# Patient Record
Sex: FEMALE | Race: BLACK OR AFRICAN AMERICAN | NOT HISPANIC OR LATINO
[De-identification: names, ages, dates, MRNs, and addresses within clinical notes are randomized per-mention and may not be internally consistent; named-entity substitution may affect disease eponyms.]

---

## 2022-11-29 ENCOUNTER — NON-APPOINTMENT (OUTPATIENT)
Age: 22
End: 2022-11-29

## 2023-03-11 ENCOUNTER — EMERGENCY (EMERGENCY)
Facility: HOSPITAL | Age: 23
LOS: 1 days | Discharge: HOME | End: 2023-03-11
Attending: EMERGENCY MEDICINE
Payer: COMMERCIAL

## 2023-03-11 VITALS
TEMPERATURE: 99 F | RESPIRATION RATE: 20 BRPM | DIASTOLIC BLOOD PRESSURE: 89 MMHG | OXYGEN SATURATION: 100 % | SYSTOLIC BLOOD PRESSURE: 147 MMHG | HEART RATE: 118 BPM | WEIGHT: 160.06 LBS

## 2023-03-11 LAB
ALBUMIN SERPL ELPH-MCNC: 5 G/DL — SIGNIFICANT CHANGE UP (ref 3.3–5.2)
ALP SERPL-CCNC: 64 U/L — SIGNIFICANT CHANGE UP (ref 40–120)
ALT FLD-CCNC: 21 U/L — SIGNIFICANT CHANGE UP
AMPHET UR-MCNC: NEGATIVE — SIGNIFICANT CHANGE UP
ANION GAP SERPL CALC-SCNC: 22 MMOL/L — HIGH (ref 5–17)
APAP SERPL-MCNC: <3 UG/ML — LOW (ref 10–26)
APPEARANCE UR: CLEAR — SIGNIFICANT CHANGE UP
AST SERPL-CCNC: 15 U/L — SIGNIFICANT CHANGE UP
BACTERIA # UR AUTO: ABNORMAL
BARBITURATES UR SCN-MCNC: NEGATIVE — SIGNIFICANT CHANGE UP
BASOPHILS # BLD AUTO: 0.03 K/UL — SIGNIFICANT CHANGE UP (ref 0–0.2)
BASOPHILS NFR BLD AUTO: 0.5 % — SIGNIFICANT CHANGE UP (ref 0–2)
BENZODIAZ UR-MCNC: NEGATIVE — SIGNIFICANT CHANGE UP
BILIRUB SERPL-MCNC: 0.6 MG/DL — SIGNIFICANT CHANGE UP (ref 0.4–2)
BILIRUB UR-MCNC: NEGATIVE — SIGNIFICANT CHANGE UP
BUN SERPL-MCNC: 10.2 MG/DL — SIGNIFICANT CHANGE UP (ref 8–20)
CALCIUM SERPL-MCNC: 9.9 MG/DL — SIGNIFICANT CHANGE UP (ref 8.4–10.5)
CHLORIDE SERPL-SCNC: 98 MMOL/L — SIGNIFICANT CHANGE UP (ref 96–108)
CO2 SERPL-SCNC: 17 MMOL/L — LOW (ref 22–29)
COCAINE METAB.OTHER UR-MCNC: NEGATIVE — SIGNIFICANT CHANGE UP
COLOR SPEC: YELLOW — SIGNIFICANT CHANGE UP
CREAT SERPL-MCNC: 0.74 MG/DL — SIGNIFICANT CHANGE UP (ref 0.5–1.3)
DIFF PNL FLD: ABNORMAL
EGFR: 118 ML/MIN/1.73M2 — SIGNIFICANT CHANGE UP
EOSINOPHIL # BLD AUTO: 0.01 K/UL — SIGNIFICANT CHANGE UP (ref 0–0.5)
EOSINOPHIL NFR BLD AUTO: 0.2 % — SIGNIFICANT CHANGE UP (ref 0–6)
EPI CELLS # UR: ABNORMAL
ETHANOL SERPL-MCNC: <10 MG/DL — SIGNIFICANT CHANGE UP (ref 0–9)
GLUCOSE SERPL-MCNC: 103 MG/DL — HIGH (ref 70–99)
GLUCOSE UR QL: NEGATIVE MG/DL — SIGNIFICANT CHANGE UP
HCG SERPL-ACNC: <4 MIU/ML — SIGNIFICANT CHANGE UP
HCT VFR BLD CALC: 44.5 % — SIGNIFICANT CHANGE UP (ref 34.5–45)
HGB BLD-MCNC: 14.5 G/DL — SIGNIFICANT CHANGE UP (ref 11.5–15.5)
IMM GRANULOCYTES NFR BLD AUTO: 0.3 % — SIGNIFICANT CHANGE UP (ref 0–0.9)
KETONES UR-MCNC: ABNORMAL
LEUKOCYTE ESTERASE UR-ACNC: NEGATIVE — SIGNIFICANT CHANGE UP
LIDOCAIN IGE QN: 30 U/L — SIGNIFICANT CHANGE UP (ref 22–51)
LYMPHOCYTES # BLD AUTO: 1.63 K/UL — SIGNIFICANT CHANGE UP (ref 1–3.3)
LYMPHOCYTES # BLD AUTO: 25 % — SIGNIFICANT CHANGE UP (ref 13–44)
MCHC RBC-ENTMCNC: 27.7 PG — SIGNIFICANT CHANGE UP (ref 27–34)
MCHC RBC-ENTMCNC: 32.6 GM/DL — SIGNIFICANT CHANGE UP (ref 32–36)
MCV RBC AUTO: 84.9 FL — SIGNIFICANT CHANGE UP (ref 80–100)
METHADONE UR-MCNC: NEGATIVE — SIGNIFICANT CHANGE UP
MONOCYTES # BLD AUTO: 0.51 K/UL — SIGNIFICANT CHANGE UP (ref 0–0.9)
MONOCYTES NFR BLD AUTO: 7.8 % — SIGNIFICANT CHANGE UP (ref 2–14)
NEUTROPHILS # BLD AUTO: 4.33 K/UL — SIGNIFICANT CHANGE UP (ref 1.8–7.4)
NEUTROPHILS NFR BLD AUTO: 66.2 % — SIGNIFICANT CHANGE UP (ref 43–77)
NITRITE UR-MCNC: NEGATIVE — SIGNIFICANT CHANGE UP
OPIATES UR-MCNC: NEGATIVE — SIGNIFICANT CHANGE UP
PCP SPEC-MCNC: SIGNIFICANT CHANGE UP
PCP UR-MCNC: NEGATIVE — SIGNIFICANT CHANGE UP
PH UR: 6 — SIGNIFICANT CHANGE UP (ref 5–8)
PLATELET # BLD AUTO: 285 K/UL — SIGNIFICANT CHANGE UP (ref 150–400)
POTASSIUM SERPL-MCNC: 3.6 MMOL/L — SIGNIFICANT CHANGE UP (ref 3.5–5.3)
POTASSIUM SERPL-SCNC: 3.6 MMOL/L — SIGNIFICANT CHANGE UP (ref 3.5–5.3)
PROT SERPL-MCNC: 8.3 G/DL — SIGNIFICANT CHANGE UP (ref 6.6–8.7)
PROT UR-MCNC: 100
RBC # BLD: 5.24 M/UL — HIGH (ref 3.8–5.2)
RBC # FLD: 12.8 % — SIGNIFICANT CHANGE UP (ref 10.3–14.5)
RBC CASTS # UR COMP ASSIST: SIGNIFICANT CHANGE UP /HPF (ref 0–4)
SALICYLATES SERPL-MCNC: <0.6 MG/DL — LOW (ref 10–20)
SARS-COV-2 RNA SPEC QL NAA+PROBE: SIGNIFICANT CHANGE UP
SODIUM SERPL-SCNC: 137 MMOL/L — SIGNIFICANT CHANGE UP (ref 135–145)
SP GR SPEC: 1.02 — SIGNIFICANT CHANGE UP (ref 1.01–1.02)
THC UR QL: POSITIVE
UROBILINOGEN FLD QL: 1 MG/DL
WBC # BLD: 6.53 K/UL — SIGNIFICANT CHANGE UP (ref 3.8–10.5)
WBC # FLD AUTO: 6.53 K/UL — SIGNIFICANT CHANGE UP (ref 3.8–10.5)
WBC UR QL: SIGNIFICANT CHANGE UP /HPF (ref 0–5)

## 2023-03-11 PROCEDURE — 93010 ELECTROCARDIOGRAM REPORT: CPT

## 2023-03-11 PROCEDURE — 99285 EMERGENCY DEPT VISIT HI MDM: CPT

## 2023-03-11 RX ORDER — FAMOTIDINE 10 MG/ML
20 INJECTION INTRAVENOUS ONCE
Refills: 0 | Status: COMPLETED | OUTPATIENT
Start: 2023-03-11 | End: 2023-03-11

## 2023-03-11 RX ORDER — ACETAMINOPHEN 500 MG
975 TABLET ORAL ONCE
Refills: 0 | Status: COMPLETED | OUTPATIENT
Start: 2023-03-11 | End: 2023-03-11

## 2023-03-11 RX ADMIN — FAMOTIDINE 20 MILLIGRAM(S): 10 INJECTION INTRAVENOUS at 18:37

## 2023-03-11 RX ADMIN — Medication 975 MILLIGRAM(S): at 18:37

## 2023-03-11 NOTE — ED ADULT NURSE REASSESSMENT NOTE - NS ED NURSE REASSESS COMMENT FT1
assumed pt care at 1930 - alert, awake and oriented, speaking in full sentences. pt awaiting for lab results. provided with warm blanket. will continue to monitor.

## 2023-03-11 NOTE — ED PROVIDER NOTE - CLINICAL SUMMARY MEDICAL DECISION MAKING FREE TEXT BOX
21yo F denies pmh presents c/o abdominal pain, bizarre behavior. Pepcid, tylenol, zofran for abdominal symptoms. Labs pending. Psych consulted. 23yo F denies pmh presents c/o abdominal pain, bizarre behavior. Pepcid, tylenol, zofran for abdominal symptoms. Labs pending. Psych consulted.    CHEYENNE Dockery: pt denies any physical complaints. Labs WNL, no leukocytosis, H+H stable, electrolytes stable. UA showed no evidence of infection. tox screen pos for THC. EKG NSR. reviewed results with pt. psych consulted. admitted to [facility] for transfer to [facility] unit on 913. signout out to Dr. Joaquin.
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
## 2023-03-11 NOTE — ED ADULT NURSE NOTE - OBJECTIVE STATEMENT
Assumed care of pt in [facility]. Pt A&O x 4 BIBA for abdominal pain x 6 days. Denies new foods. Denies sick contacts. +BS all 4 quadrants. Abdomen soft. No N/V/D. IV access obtained; specimens sent to lab.

## 2023-03-11 NOTE — ED PROVIDER NOTE - PHYSICAL EXAMINATION
General: well appearing, NAD  Head: NC/AT  Cardiac: RRR, no m/r/g  Respiratory: CTABL, equal chest wall expansions, no conversational dyspnea  Abdomen: soft, ND, +mild epigastric ttp  Neuro: AAOx3, coordinated movement of all 4 extremities  Psych: calm, cooperative, flattened affect  Skin: warm and dry General: well appearing, NAD  Head: NC/AT  Cardiac: RRR, no m/r/g  Respiratory: CTABL, equal chest wall expansions, no conversational dyspnea  Abdomen: soft, ND, +mild epigastric ttp  Neuro: AAOx3, coordinated movement of all 4 extremities  Psych: calm, cooperative, flattened affect  Skin: warm and dry.

## 2023-03-11 NOTE — ED PROVIDER NOTE - NS ED ROS FT
Constitutional: no fever, no sweats, and no chills.  CV: no chest pain, no edema.  Resp: no cough, no dyspnea  GI: +abdominal pain, +nausea and no vomiting.  MSK: no msk pain, no weakness  Skin: no lesions, and no rashes.  Neuro: no LOC, no headache, no dizziness  Psych: no SI, no HI  ROS otherwise negative except as noted in HPI.

## 2023-03-11 NOTE — ED ADULT TRIAGE NOTE - GLASGOW COMA SCALE: BEST MOTOR RESPONSE, MLM
Patient Education     Sinusitis (No Antibiotics)    The sinuses are air-filled spaces within the bones of the face. They connect to the inside of the nose. Sinusitis is an inflammation of the tissue lining the sinus cavity. Sinus inflammation can occur during a cold. It can also be due to allergies to pollens and other particles in the air. It can cause symptoms such as sinus congestion, headache, sore throat, facial swelling and fullness. It may also cause a low-grade fever. No infection is present, and no antibiotic treatment is needed.  Home care  · Drink plenty of water, hot tea, and other liquids. This may help thin mucus. It also may promote sinus drainage.  · Heat may help soothe painful areas of the face. Use a towel soaked in hot water. Or,  the shower and direct the hot spray onto your face. Using a vaporizer along with a menthol rub at night may also help.   · An expectorant containing guaifenesin may help thin the mucus and promote drainage from the sinuses.  · Over-the-counter decongestants may be used unless a similar medicine was prescribed. Nasal sprays work the fastest. Use one that contains phenylephrine or oxymetazoline. First blow the nose gently. Then use the spray. Do not use these medicines more often than directed on the label or symptoms may get worse. You may also use tablets containing pseudoephedrine. Avoid products that combine ingredients, because side effects may be increased. Read labels. You can also ask the pharmacist for help. (NOTE: Persons with high blood pressure should not use decongestants. They can raise blood pressure.)  · Over-the-counter antihistamines may help if allergies contributed to your sinusitis.    · Use acetaminophen or ibuprofen to control pain, unless another pain medicine was prescribed. (If you have chronic liver or kidney disease or ever had a stomach ulcer, talk with your doctor before using these medicines. Aspirin should never be used in anyone  under 18 years of age who is ill with a fever. It may cause severe liver damage.)  · Use nasal rinses or irrigation as instructed by your health care provider.  · Don't smoke. This can worsen symptoms.  Follow-up care  Follow up with your healthcare provider or our staff if you are not improving within the next week.  When to seek medical advice  Call your healthcare provider if any of these occur:  · Green or yellow discharge from the nose or into the throat  · Facial pain or headache becoming more severe  · Stiff neck  · Unusual drowsiness or confusion  · Swelling of the forehead or eyelids  · Vision problems, including blurred or double vision  · Fever of 100.4ºF (38ºC) or higher, or as directed by your healthcare provider  · Seizure  · Breathing problems  · Symptoms not resolving within 10 days  © 1151-0279 [employer]. [address] [place] [address]. All rights reserved. This information is not intended as a substitute for professional medical care. Always follow your healthcare professional's instructions.           Patient Education     External Ear Infection (Adult)    External otitis (also called “swimmer’s ear”) is an infection in the ear canal. It is often caused by bacteria or fungus. It can occur a few days after water gets trapped in the ear canal (from swimming or bathing). It can also occur after cleaning too deeply in the ear canal with a cotton swab or other object. Sometimes, hair care products get into the ear canal and cause this problem.  Symptoms can include pain, fever, itching, redness, drainage, or swelling of the ear canal. Temporary hearing loss may also occur.  Home care  · Do not try to clean the ear canal. This can push pus and bacteria deeper into the canal.  · Use prescribed ear drops as directed. These help reduce swelling and fight the infection. If an ear wick was placed in the ear canal, apply drops right onto the end of the wick. The wick will draw the  medication into the ear canal even if it is swollen closed.  · A cotton ball may be loosely placed in the outer ear to absorb any drainage.  · You may use acetaminophen or ibuprofen to control pain, unless another medication was prescribed. Note: If you have chronic liver or kidney disease or ever had a stomach ulcer or GI bleeding, talk to your health care provider before taking any of these medications.  · Do not allow water to get into your ear when bathing. Also, avoid swimming until the infection has cleared.  Prevention  · Keep your ears dry. This helps lower the risk of infection. Dry your ears with a towel or hair dryer after getting wet. Also, use ear plugs when swimming.  · Do not stick any objects in the ear to remove wax.  · If you feel water trapped in your ear, use ear drops right away. You can get these drops over the counter at most drugstores.  They work by removing water from the ear canal.  Follow-up care  Follow up with your health care provider in one week, or as advised.   When to seek medical advice  Call your health care provider right away if any of these occur:  · Ear pain becomes worse or doesn’t improve after 3 days of treatment  · Redness or swelling of the outer ear occurs or gets worse  · Headache  · Painful or stiff neck  · Drowsiness or confusion  · Fever of 100.4ºF (38ºC) or higher, or as directed by your health care provider  · Seizure  © 0776-1518 [employer]. [address] [place] [address], [place] [address]. All rights reserved. This information is not intended as a substitute for professional medical care. Always follow your healthcare professional's instructions.            (M6) obeys commands

## 2023-03-12 ENCOUNTER — INPATIENT (INPATIENT)
Facility: HOSPITAL | Age: 23
LOS: 25 days | Discharge: HOME | End: 2023-04-07
Attending: STUDENT IN AN ORGANIZED HEALTH CARE EDUCATION/TRAINING PROGRAM | Admitting: PSYCHIATRY & NEUROLOGY
Payer: COMMERCIAL

## 2023-03-12 VITALS
SYSTOLIC BLOOD PRESSURE: 122 MMHG | TEMPERATURE: 98 F | RESPIRATION RATE: 18 BRPM | DIASTOLIC BLOOD PRESSURE: 82 MMHG | OXYGEN SATURATION: 97 % | HEART RATE: 103 BPM

## 2023-03-12 VITALS — RESPIRATION RATE: 16 BRPM | TEMPERATURE: 99 F | WEIGHT: 151.9 LBS

## 2023-03-12 DIAGNOSIS — F29 UNSPECIFIED PSYCHOSIS NOT DUE TO A SUBSTANCE OR KNOWN PHYSIOLOGICAL CONDITION: ICD-10-CM

## 2023-03-12 DIAGNOSIS — R46.2 STRANGE AND INEXPLICABLE BEHAVIOR: ICD-10-CM

## 2023-03-12 PROCEDURE — 93005 ELECTROCARDIOGRAM TRACING: CPT

## 2023-03-12 PROCEDURE — 84702 CHORIONIC GONADOTROPIN TEST: CPT

## 2023-03-12 PROCEDURE — 99284 EMERGENCY DEPT VISIT MOD MDM: CPT | Mod: 1L,95

## 2023-03-12 PROCEDURE — 36415 COLL VENOUS BLD VENIPUNCTURE: CPT

## 2023-03-12 PROCEDURE — 90792 PSYCH DIAG EVAL W/MED SRVCS: CPT | Mod: 1L,95

## 2023-03-12 PROCEDURE — 99284 EMERGENCY DEPT VISIT MOD MDM: CPT | Mod: 25

## 2023-03-12 PROCEDURE — 85025 COMPLETE CBC W/AUTO DIFF WBC: CPT

## 2023-03-12 PROCEDURE — 99233 SBSQ HOSP IP/OBS HIGH 50: CPT

## 2023-03-12 PROCEDURE — 80307 DRUG TEST PRSMV CHEM ANLYZR: CPT

## 2023-03-12 PROCEDURE — 96374 THER/PROPH/DIAG INJ IV PUSH: CPT

## 2023-03-12 PROCEDURE — U0003: CPT

## 2023-03-12 PROCEDURE — 83690 ASSAY OF LIPASE: CPT

## 2023-03-12 PROCEDURE — 80053 COMPREHEN METABOLIC PANEL: CPT

## 2023-03-12 PROCEDURE — G0378: CPT

## 2023-03-12 PROCEDURE — U0005: CPT

## 2023-03-12 PROCEDURE — 81001 URINALYSIS AUTO W/SCOPE: CPT

## 2023-03-12 RX ORDER — ACETAMINOPHEN 500 MG
650 TABLET ORAL EVERY 6 HOURS
Refills: 0 | Status: DISCONTINUED | OUTPATIENT
Start: 2023-03-12 | End: 2023-04-07

## 2023-03-12 RX ORDER — DIPHENHYDRAMINE HCL 50 MG
50 CAPSULE ORAL EVERY 6 HOURS
Refills: 0 | Status: DISCONTINUED | OUTPATIENT
Start: 2023-03-12 | End: 2023-04-07

## 2023-03-12 RX ORDER — HALOPERIDOL DECANOATE 100 MG/ML
2 INJECTION INTRAMUSCULAR ONCE
Refills: 0 | Status: DISCONTINUED | OUTPATIENT
Start: 2023-03-12 | End: 2023-04-07

## 2023-03-12 RX ORDER — HALOPERIDOL DECANOATE 100 MG/ML
2 INJECTION INTRAMUSCULAR EVERY 6 HOURS
Refills: 0 | Status: DISCONTINUED | OUTPATIENT
Start: 2023-03-12 | End: 2023-04-07

## 2023-03-12 RX ORDER — LANOLIN ALCOHOL/MO/W.PET/CERES
3 CREAM (GRAM) TOPICAL AT BEDTIME
Refills: 0 | Status: DISCONTINUED | OUTPATIENT
Start: 2023-03-12 | End: 2023-04-07

## 2023-03-12 RX ORDER — DIPHENHYDRAMINE HCL 50 MG
50 CAPSULE ORAL ONCE
Refills: 0 | Status: DISCONTINUED | OUTPATIENT
Start: 2023-03-12 | End: 2023-04-07

## 2023-03-12 RX ADMIN — Medication 3 MILLIGRAM(S): at 21:03

## 2023-03-12 NOTE — BH PATIENT PROFILE - FALL HARM RISK - UNIVERSAL INTERVENTIONS
Bed in lowest position, wheels locked, appropriate side rails in place/Call bell, personal items and telephone in reach/Instruct patient to call for assistance before getting out of bed or chair/Non-slip footwear when patient is out of bed/[place] to call system/Physically safe environment - no spills, clutter or unnecessary equipment/Purposeful Proactive Rounding/Room/bathroom lighting operational, light cord in reach

## 2023-03-12 NOTE — ED BEHAVIORAL HEALTH ASSESSMENT NOTE - OTHER
family danger to self/others and unable to care for self - patient presents disorganized, impulsive and paranoid

## 2023-03-12 NOTE — ED BEHAVIORAL HEALTH ASSESSMENT NOTE - DESCRIPTION
lives at home with parents and sister 12yo, parents are nurses, patient was in [employer] getting bachelors in science/psychology denies calm and cooperative
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
## 2023-03-12 NOTE — ED ADULT NURSE REASSESSMENT NOTE - NS ED NURSE REASSESS COMMENT FT1
pt resting/sleeping in NAD at this time. pt awakened to offer the telephone as requested by her father. pt responded that she will call him later. pt offers no complaints at this time. pt has made no attempts to harm herself or others at this time. pt safety being maintained.
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
## 2023-03-12 NOTE — ED BEHAVIORAL HEALTH NOTE - BEHAVIORAL HEALTH NOTE
===================     PRE-HOSPITAL COURSE     ===================     SOURCE: RN and secondhand ED documentation      DETAILS: BIB EMS     ============     ED COURSE     ============     SOURCE: RN and secondhand ED documentation      ARRIVAL: [facility]     BELONGINGS: No belongings of note. All belongings were provided to hospital security, and patient currently in a gown with a 1:1 staff member.     BEHAVIOR:   Writer attempted to contact RN multiple times and was unsuccessful. Per chart note, pt has been calm, cooperative and in behavioral control in ER. Per chart, pt presented for abdominal pain and bizarre behavior. Per chart, pt’s father noted pt has been staying at her grandparents' house and refusing to leave the room.      TREATMENT:  none required      VISITORS: no one at bedside

## 2023-03-12 NOTE — ED CDU PROVIDER INITIAL DAY NOTE - CLINICAL SUMMARY MEDICAL DECISION MAKING FREE TEXT BOX
23yo F denies pmh BIBA presents c/o abdominal pain. Pt BIBA as per parents request for bizzare behavior and depression. pt denied bizzare behavior, depression, SI to ED providers and c/o abdominal pain. pain was treated in ED. pt reports improvement of pain. Psych consulted. Labs WNL, no leukocytosis, H+H stable, electrolytes stable. UA showed no evidence of infection. tox screen pos for THC. EKG NSR. reviewed results with pt. psych consulted. placed in virtual obs for transfer to [facility] unit on 913, per psych eval. awaiting transfer.

## 2023-03-12 NOTE — ED ADULT NURSE REASSESSMENT NOTE - NS ED NURSE REASSESS COMMENT FT1
Patient awaiting transfer to [facility]. Aware of plan to transfer, expressed understanding of transfer process. Nurse to nurse report given to MARY KATE Powers at receiving facility. Pt mood depressed, affect appropriate to mood. No complaints offered. Awaiting EMS transport. Patient awaiting transfer to [facility]. Aware of plan to transfer, expressed understanding of transfer process. Nurse to nurse report given to MARY KATE Dyer at receiving facility. Pt mood depressed, affect appropriate to mood. No complaints offered. Awaiting EMS transport.

## 2023-03-12 NOTE — ED BEHAVIORAL HEALTH ASSESSMENT NOTE - RISK ASSESSMENT
3 patient is at low risk for self harm. patient is at moderate risk for harm. risk factors include presenting symptoms (disorganized, tangential), poor sleep, poor ADLs, depressive symptoms, poor functioning, not engaged in active treatment, impulsive and unpredictable, poor insight. Protective factors include family support, patient denies suicidal ideation,

## 2023-03-12 NOTE — ED CDU PROVIDER INITIAL DAY NOTE - NS ED ATTENDING STATEMENT MOD
This was a shared visit with the LEODAN. I reviewed and verified the documentation and independently performed the documented:

## 2023-03-12 NOTE — ED ADULT NURSE REASSESSMENT NOTE - NS ED NURSE REASSESS COMMENT FT1
Assumed care of pt from previous RN. A&Ox4, RR even and unlabored. skin is warm and dry. PT denies any complaints at this time. Awaiting reeval by psych.
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
## 2023-03-12 NOTE — ED ADULT NURSE REASSESSMENT NOTE - COMFORT CARE
ambulated to bathroom/darkened lights/meal provided/plan of care explained/po fluids offered/warm blanket provided
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
## 2023-03-12 NOTE — ED ADULT NURSE REASSESSMENT NOTE - NS ED NURSE REASSESS COMMENT FT1
PT to be involuntary psych admission to [facility]. PT denies SI/HI or hallucinations. PT being changed into yellow gown and all belongings moved and secured. PT has understanding for psych admission. Report given to Humberto HARTMANN in .

## 2023-03-12 NOTE — ED CDU PROVIDER INITIAL DAY NOTE - PHYSICAL EXAMINATION
General: well appearing, NAD  Head: NC/AT  Cardiac: RRR, no m/r/g  Respiratory: CTABL, equal chest wall expansions, no conversational dyspnea  Abdomen: soft, ND, +mild epigastric ttp  Neuro: AAOx3, coordinated movement of all 4 extremities  Psych: calm, cooperative, flattened affect  Skin: warm and dry.

## 2023-03-12 NOTE — ED CDU PROVIDER INITIAL DAY NOTE - ATTENDING APP SHARED VISIT CONTRIBUTION OF CARE
22yoF; with no signif pmh; now p/w increasing paranoia, locking herself in room, and failing to perform ADLs successfully over past 2months.  originally presented with abd pain  -Psych evaluation  -observation  -likely inpatient admission for stabilization

## 2023-03-12 NOTE — CHART NOTE - NSCHARTNOTEFT_GEN_A_CORE
YAZAN Note: SW reviewed pt chart - pt plan for inpt psych tx however status unclear. YAZAN reached out to [employer] [phone] to clarify what the legal status is - same note has listed 9.13 and 9.39. [employer] will reach out to MD to clarify and then contact YAZAN back. SW will follow.

## 2023-03-12 NOTE — ED BEHAVIORAL HEALTH ASSESSMENT NOTE - MODE OF ARRIVAL
The patient has been re-examined and I agree with the above assessment or I updated with my findings. EMT / Paramedic

## 2023-03-13 DIAGNOSIS — F06.1 CATATONIC DISORDER DUE TO KNOWN PHYSIOLOGICAL CONDITION: ICD-10-CM

## 2023-03-13 PROBLEM — Z78.9 OTHER SPECIFIED HEALTH STATUS: Chronic | Status: ACTIVE | Noted: 2023-03-11

## 2023-03-13 LAB
A1C WITH ESTIMATED AVERAGE GLUCOSE RESULT: 4.7 % — SIGNIFICANT CHANGE UP (ref 4–5.6)
ALBUMIN SERPL ELPH-MCNC: 4.7 G/DL — SIGNIFICANT CHANGE UP (ref 3.3–5)
ALP SERPL-CCNC: 56 U/L — SIGNIFICANT CHANGE UP (ref 40–120)
ALT FLD-CCNC: 18 U/L — SIGNIFICANT CHANGE UP (ref 4–33)
ANION GAP SERPL CALC-SCNC: 16 MMOL/L — HIGH (ref 7–14)
AST SERPL-CCNC: 14 U/L — SIGNIFICANT CHANGE UP (ref 4–32)
BASOPHILS # BLD AUTO: 0.04 K/UL — SIGNIFICANT CHANGE UP (ref 0–0.2)
BASOPHILS NFR BLD AUTO: 0.6 % — SIGNIFICANT CHANGE UP (ref 0–2)
BILIRUB SERPL-MCNC: 0.3 MG/DL — SIGNIFICANT CHANGE UP (ref 0.2–1.2)
BUN SERPL-MCNC: 8 MG/DL — SIGNIFICANT CHANGE UP (ref 7–23)
CALCIUM SERPL-MCNC: 9.8 MG/DL — SIGNIFICANT CHANGE UP (ref 8.4–10.5)
CHLORIDE SERPL-SCNC: 103 MMOL/L — SIGNIFICANT CHANGE UP (ref 98–107)
CHOLEST SERPL-MCNC: 152 MG/DL — SIGNIFICANT CHANGE UP
CO2 SERPL-SCNC: 22 MMOL/L — SIGNIFICANT CHANGE UP (ref 22–31)
CREAT SERPL-MCNC: 0.74 MG/DL — SIGNIFICANT CHANGE UP (ref 0.5–1.3)
EGFR: 118 ML/MIN/1.73M2 — SIGNIFICANT CHANGE UP
EOSINOPHIL # BLD AUTO: 0.04 K/UL — SIGNIFICANT CHANGE UP (ref 0–0.5)
EOSINOPHIL NFR BLD AUTO: 0.6 % — SIGNIFICANT CHANGE UP (ref 0–6)
ESTIMATED AVERAGE GLUCOSE: 88 — SIGNIFICANT CHANGE UP
GLUCOSE SERPL-MCNC: 116 MG/DL — HIGH (ref 70–99)
HCT VFR BLD CALC: 40.4 % — SIGNIFICANT CHANGE UP (ref 34.5–45)
HDLC SERPL-MCNC: 28 MG/DL — LOW
HGB BLD-MCNC: 12.8 G/DL — SIGNIFICANT CHANGE UP (ref 11.5–15.5)
IANC: 4.13 K/UL — SIGNIFICANT CHANGE UP (ref 1.8–7.4)
IMM GRANULOCYTES NFR BLD AUTO: 0.3 % — SIGNIFICANT CHANGE UP (ref 0–0.9)
LIPID PNL WITH DIRECT LDL SERPL: 111 MG/DL — HIGH
LYMPHOCYTES # BLD AUTO: 2.23 K/UL — SIGNIFICANT CHANGE UP (ref 1–3.3)
LYMPHOCYTES # BLD AUTO: 31 % — SIGNIFICANT CHANGE UP (ref 13–44)
MCHC RBC-ENTMCNC: 26.6 PG — LOW (ref 27–34)
MCHC RBC-ENTMCNC: 31.7 GM/DL — LOW (ref 32–36)
MCV RBC AUTO: 84 FL — SIGNIFICANT CHANGE UP (ref 80–100)
MONOCYTES # BLD AUTO: 0.74 K/UL — SIGNIFICANT CHANGE UP (ref 0–0.9)
MONOCYTES NFR BLD AUTO: 10.3 % — SIGNIFICANT CHANGE UP (ref 2–14)
NEUTROPHILS # BLD AUTO: 4.13 K/UL — SIGNIFICANT CHANGE UP (ref 1.8–7.4)
NEUTROPHILS NFR BLD AUTO: 57.2 % — SIGNIFICANT CHANGE UP (ref 43–77)
NON HDL CHOLESTEROL: 124 MG/DL — SIGNIFICANT CHANGE UP
NRBC # BLD: 0 /100 WBCS — SIGNIFICANT CHANGE UP (ref 0–0)
NRBC # FLD: 0 K/UL — SIGNIFICANT CHANGE UP (ref 0–0)
PLATELET # BLD AUTO: 288 K/UL — SIGNIFICANT CHANGE UP (ref 150–400)
POTASSIUM SERPL-MCNC: 3.1 MMOL/L — LOW (ref 3.5–5.3)
POTASSIUM SERPL-SCNC: 3.1 MMOL/L — LOW (ref 3.5–5.3)
PROT SERPL-MCNC: 7.6 G/DL — SIGNIFICANT CHANGE UP (ref 6–8.3)
RBC # BLD: 4.81 M/UL — SIGNIFICANT CHANGE UP (ref 3.8–5.2)
RBC # FLD: 12.7 % — SIGNIFICANT CHANGE UP (ref 10.3–14.5)
SODIUM SERPL-SCNC: 141 MMOL/L — SIGNIFICANT CHANGE UP (ref 135–145)
TRIGL SERPL-MCNC: 64 MG/DL — SIGNIFICANT CHANGE UP
WBC # BLD: 7.2 K/UL — SIGNIFICANT CHANGE UP (ref 3.8–10.5)
WBC # FLD AUTO: 7.2 K/UL — SIGNIFICANT CHANGE UP (ref 3.8–10.5)

## 2023-03-13 PROCEDURE — 93010 ELECTROCARDIOGRAM REPORT: CPT

## 2023-03-13 PROCEDURE — 99222 1ST HOSP IP/OBS MODERATE 55: CPT

## 2023-03-13 PROCEDURE — 99223 1ST HOSP IP/OBS HIGH 75: CPT

## 2023-03-13 RX ORDER — POTASSIUM CHLORIDE 20 MEQ
40 PACKET (EA) ORAL EVERY 4 HOURS
Refills: 0 | Status: COMPLETED | OUTPATIENT
Start: 2023-03-13 | End: 2023-03-13

## 2023-03-13 RX ORDER — ONDANSETRON 8 MG/1
8 TABLET, FILM COATED ORAL
Refills: 0 | Status: DISCONTINUED | OUTPATIENT
Start: 2023-03-13 | End: 2023-04-07

## 2023-03-13 RX ORDER — RISPERIDONE 4 MG/1
1 TABLET ORAL AT BEDTIME
Refills: 0 | Status: DISCONTINUED | OUTPATIENT
Start: 2023-03-13 | End: 2023-03-14

## 2023-03-13 RX ADMIN — Medication 40 MILLIEQUIVALENT(S): at 21:20

## 2023-03-13 RX ADMIN — Medication 1 MILLIGRAM(S): at 16:21

## 2023-03-13 RX ADMIN — ONDANSETRON 8 MILLIGRAM(S): 8 TABLET, FILM COATED ORAL at 09:57

## 2023-03-13 RX ADMIN — Medication 1 MILLIGRAM(S): at 21:16

## 2023-03-13 RX ADMIN — RISPERIDONE 1 MILLIGRAM(S): 4 TABLET ORAL at 21:16

## 2023-03-13 NOTE — BH INPATIENT PSYCHIATRY ASSESSMENT NOTE - NSBHCRANIAL_PSY_ALL_CORE
Normal speech (IX, X, XII)/Extraocular Eye Movement Intact  (III, IV, VI)/Shoulder shrug (XI)/Hearing intact (VIII)

## 2023-03-13 NOTE — CHART NOTE - NSCHARTNOTEFT_GEN_A_CORE
Screening Medical Evaluation  Patient Admitted from: [facility] ED    [facility] admitting diagnosis: Non-organic psychosis    PAST MEDICAL & SURGICAL HISTORY:  No pertinent past medical history            Allergies    No Known Allergies    Intolerances        Social History:     FAMILY HISTORY:      MEDICATIONS  (STANDING):    MEDICATIONS  (PRN):  acetaminophen     Tablet .. 650 milliGRAM(s) Oral every 6 hours PRN Mild Pain (1 - 3), Moderate Pain (4 - 6), Severe Pain (7 - 10)  diphenhydrAMINE 50 milliGRAM(s) Oral every 6 hours PRN agitation  diphenhydrAMINE Injectable 50 milliGRAM(s) IntraMuscular once PRN agtiation  haloperidol     Tablet 2 milliGRAM(s) Oral every 6 hours PRN psychosis  haloperidol    Injectable 2 milliGRAM(s) IntraMuscular once PRN agitation  LORazepam     Tablet 2 milliGRAM(s) Oral every 6 hours PRN agitation  LORazepam   Injectable 2 milliGRAM(s) IntraMuscular once PRN agitation  melatonin. 3 milliGRAM(s) Oral at bedtime PRN Insomnia      Vital Signs Last 24 Hrs  T(C): 36.3 (12 Mar 2023 20:29), Max: 37.1 (12 Mar 2023 19:18)  T(F): 97.4 (12 Mar 2023 20:29), Max: 98.8 (12 Mar 2023 19:18)  HR: 103 (12 Mar 2023 15:25) (92 - 112)  BP: 122/82 (12 Mar 2023 15:25) (110/78 - 132/84)  BP(mean): --  RR: 16 (12 Mar 2023 19:18) (16 - 18)  SpO2: 97% (12 Mar 2023 15:25) (97% - 98%)      CAPILLARY BLOOD GLUCOSE            PHYSICAL EXAM:  GENERAL: NAD, well-developed  HEAD:  Atraumatic, Normocephalic  EYES: EOMI, PERRLA, conjunctiva and sclera clear  NECK: Supple, No JVD  CHEST/LUNG: Clear to auscultation bilaterally; No wheeze  HEART: Regular rate and rhythm; No murmurs, rubs, or gallops  ABDOMEN: Soft, Nontender, Nondistended; Bowel sounds present  EXTREMITIES:  2+ Peripheral Pulses, No clubbing, cyanosis, or edema  PSYCH: AAOx3  NEUROLOGY: non-focal  SKIN: No rashes or lesions    LABS:                        14.5   6.53  )-----------( 285      ( 11 Mar 2023 18:50 )             44.5     03-11    137  |  98  |  10.2  ----------------------------<  103<H>  3.6   |  17.0<L>  |  0.74    Ca    9.9      11 Mar 2023 18:50    TPro  8.3  /  Alb  5.0  /  TBili  0.6  /  DBili  x   /  AST  15  /  ALT  21  /  AlkPhos  64  03-11          Urinalysis Basic - ( 11 Mar 2023 18:50 )    Color: Yellow / Appearance: Clear / S.025 / pH: x  Gluc: x / Ketone: Large  / Bili: Negative / Urobili: 1 mg/dL   Blood: x / Protein: 100 / Nitrite: Negative   Leuk Esterase: Negative / RBC: 0-2 /HPF / WBC 3-5 /HPF   Sq Epi: x / Non Sq Epi: Moderate / Bacteria: Occasional        RADIOLOGY & ADDITIONAL TESTS:    Assessment and Plan:  22 year old male presenting today from [facility] ED to [facility] with admitting diagnosis of Non-organic psychosis with no pertinent PMH. Denies any fever, chills, headache, chest pain, SOB, abdominal pain, N/V/D/C. Physical exam unremarkable.   1) Non-organic psychosis: Follow care plan as per primary team.

## 2023-03-13 NOTE — CONSULT NOTE ADULT - SUBJECTIVE AND OBJECTIVE BOX
HPI:   22F with no known prior psych or other medical history who was admitted to [facility] for psychosis. Medicine called this AM after pt had episodes of nausea and vomiting after consuming eggs. Pt stated that she vomiting "many times" after she had eggs this morning. Vomitus consisted of sausages and the other food that she ate. Pt also endorsed seeing bright red blood in the vomit. She reports epigastric pain w/o radiation, but that has been going on for a period of time now. Otherwise, no urinary complaints. Nausea has completely subsided for now. When asked about her tremors and periodic jerky movements - pt unable to elaborate on when he it started, aggravating/alleviating factors.     PAST MEDICAL & SURGICAL HISTORY:  No pertinent past medical history      Review of Systems:   As noted in HPI.    Allergies    eggs (Nausea)  No Known Drug Allergies  Nuts (Hives)    Intolerances        Social History:   +MJ  No other reported tobacco/drug use.    FAMILY HISTORY:  Schizophrenia    MEDICATIONS  (STANDING):  LORazepam     Tablet 1 milliGRAM(s) Oral three times a day  risperiDONE   Tablet 1 milliGRAM(s) Oral at bedtime    MEDICATIONS  (PRN):  acetaminophen     Tablet .. 650 milliGRAM(s) Oral every 6 hours PRN Mild Pain (1 - 3), Moderate Pain (4 - 6), Severe Pain (7 - 10)  diphenhydrAMINE 50 milliGRAM(s) Oral every 6 hours PRN agitation  diphenhydrAMINE Injectable 50 milliGRAM(s) IntraMuscular once PRN agtiation  haloperidol     Tablet 2 milliGRAM(s) Oral every 6 hours PRN psychosis  haloperidol    Injectable 2 milliGRAM(s) IntraMuscular once PRN agitation  LORazepam     Tablet 2 milliGRAM(s) Oral every 6 hours PRN agitation  LORazepam   Injectable 2 milliGRAM(s) IntraMuscular once PRN agitation  melatonin. 3 milliGRAM(s) Oral at bedtime PRN Insomnia  ondansetron   Disintegrating Tablet 8 milliGRAM(s) Oral two times a day PRN Nausea and/or Vomiting      Vital Signs Last 24 Hrs  T(C): 36.5 (13 Mar 2023 06:26), Max: 37.1 (12 Mar 2023 19:18)  T(F): 97.7 (13 Mar 2023 06:26), Max: 98.8 (12 Mar 2023 19:18)  HR: 108 (13 Mar 2023 06:26) (103 - 108)  BP: 127/86 (13 Mar 2023 06:26) (122/82 - 127/86)  BP(mean): --  RR: 16 (12 Mar 2023 19:18) (16 - 18)  SpO2: 97% (12 Mar 2023 15:25) (97% - 97%)      CAPILLARY BLOOD GLUCOSE        PHYSICAL EXAM:  GENERAL: NAD, well-developed  HEAD:  Atraumatic, Normocephalic  EYES: EOMI, conjunctiva and sclera clear  EMNT: MMM. No tongue/lip/throat swelling.  NECK: Supple, No JVD  CHEST/LUNG: Clear to auscultation bilaterally; No wheeze  HEART: Regular rate and rhythm; No murmurs, rubs, or gallops  ABDOMEN: Soft, Nontender, Nondistended; Bowel sounds present  EXTREMITIES:  2+ Peripheral Pulses, No clubbing, cyanosis, or edema  NEUROLOGY: non-focal  SKIN: No rashes or lesions    LABS:                        12.8   7.20  )-----------( 288      ( 13 Mar 2023 10:00 )             40.4     03-13    141  |  103  |  8   ----------------------------<  116<H>  3.1<L>   |  22  |  0.74    Ca    9.8      13 Mar 2023 10:00    TPro  7.6  /  Alb  4.7  /  TBili  0.3  /  DBili  x   /  AST  14  /  ALT  18  /  AlkPhos  56  03-13          Urinalysis Basic - ( 11 Mar 2023 18:50 )    Color: Yellow / Appearance: Clear / S.025 / pH: x  Gluc: x / Ketone: Large  / Bili: Negative / Urobili: 1 mg/dL   Blood: x / Protein: 100 / Nitrite: Negative   Leuk Esterase: Negative / RBC: 0-2 /HPF / WBC 3-5 /HPF   Sq Epi: x / Non Sq Epi: Moderate / Bacteria: Occasional        EKG(personally reviewed):    RADIOLOGY & ADDITIONAL TESTS:    Imaging Personally Reviewed:    Consultant(s) Notes Reviewed:      Care Discussed with Consultants/Other Providers:

## 2023-03-13 NOTE — BH INPATIENT PSYCHIATRY ASSESSMENT NOTE - VETERAN
No
Kana Singh)  Pediatrics  [address] [place] [address]  Phone: [phone]  Fax: [phone]  Follow Up Time:

## 2023-03-13 NOTE — BH INPATIENT PSYCHIATRY ASSESSMENT NOTE - NSBHASSESSSUMMFT_PSY_ALL_CORE
Patient is a 22-year-old female with no past psychiatric history brought in by EMS activated by parents for psychosis, which began in December and worsened over the past weeks. The patient has been paranoid, exhibiting a tangential thought process, and unable to attend to her ADLs. The patient was reportedly lying in her room with feces and urine. She has been unable to eat. The patient also has a family history of schizophrenia, and she has been using cannabis leading up to her current hospitalization. The differential includes schizophreniform disorder and cannabis induced psychotic disorder, but given Her family history, a psychotic spectrum illness is more likely. Given her recent inability to care for ADLs, she is a danger to herself and therefore meets criteria for inpatient psychiatric hospitalization.  PsyHx: No admissions, no SA  : [employer] studying psychology. Lives at home with parents and 10 yo sister.     1. Admission status  9.27 (Involuntary admission)    2. Significant lab findings  - UDS positive for THC    3. Psychotropic medication  - Risperidone 1 mg QHS (psychosis)  	- Start 3/13  - Lorazepam 1 mg TID (catatonia)  	- Start 3/13    Agitation PRNs: Haloperidol PO/IM, Lorazepam PO/IM, Diphenhydramine PO/IM    4. Medical conditions, other medication, consults  None    5. Psychosocial interventions  - Patient provided verbal consent to speak with father  - CO 1:1: Not required  - Restrictions/allowances: None
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
## 2023-03-13 NOTE — BH INPATIENT PSYCHIATRY ASSESSMENT NOTE - DESCRIPTION
lives at home with parents and sister 12yo, parents are nurses, patient was in [employer] getting bachelors in science/psychology

## 2023-03-13 NOTE — CONSULT NOTE ADULT - ASSESSMENT
22F with no sig PMH admitted to [facility] for psychosis. Med c/s for vomiting.    #Food allergy  -Likely 2' egg allergy. No red flag findings on exam showing a more severe allergy. No tongue/lip swelling or other concerns for airway compromise. No skin findings.  -Now resolved.   -Anti-emetics PRN  -Self-reported blood in vomitus, but none seen by staff as discussed w/ RN. H/H stable. Watch for now.    #Hypokalemia  -Likely 2' vomiting.  -KCl 40 x2.    #Psychosis  -As per psych.

## 2023-03-13 NOTE — BH INPATIENT PSYCHIATRY ASSESSMENT NOTE - CURRENT MEDICATION
MEDICATIONS  (STANDING):    MEDICATIONS  (PRN):  acetaminophen     Tablet .. 650 milliGRAM(s) Oral every 6 hours PRN Mild Pain (1 - 3), Moderate Pain (4 - 6), Severe Pain (7 - 10)  diphenhydrAMINE 50 milliGRAM(s) Oral every 6 hours PRN agitation  diphenhydrAMINE Injectable 50 milliGRAM(s) IntraMuscular once PRN agtiation  haloperidol     Tablet 2 milliGRAM(s) Oral every 6 hours PRN psychosis  haloperidol    Injectable 2 milliGRAM(s) IntraMuscular once PRN agitation  LORazepam     Tablet 2 milliGRAM(s) Oral every 6 hours PRN agitation  LORazepam   Injectable 2 milliGRAM(s) IntraMuscular once PRN agitation  melatonin. 3 milliGRAM(s) Oral at bedtime PRN Insomnia  ondansetron   Disintegrating Tablet 8 milliGRAM(s) Oral two times a day PRN Nausea and/or Vomiting

## 2023-03-13 NOTE — BH SOCIAL WORK INITIAL PSYCHOSOCIAL EVALUATION - NSPROGENSOURCEINFOFT_PSY_ALL_CORE
EMR  Writer is a social work float. Patient is currently unable to meet or sign consent as she has been vomiting throughout the morning.

## 2023-03-13 NOTE — BH INPATIENT PSYCHIATRY ASSESSMENT NOTE - RISK ASSESSMENT
patient is at moderate risk for harm. risk factors include presenting symptoms (disorganized, tangential), poor sleep, poor ADLs, depressive symptoms, poor functioning, not engaged in active treatment, impulsive and unpredictable, poor insight. Protective factors include family support, patient denies suicidal ideation,

## 2023-03-13 NOTE — BH INPATIENT PSYCHIATRY ASSESSMENT NOTE - NSBHMETABOLIC_PSY_ALL_CORE_FT
BMI:   HbA1c: A1C with Estimated Average Glucose Result: 4.7 % (03-13-23 @ 10:00)    Glucose:   BP: 127/86 (03-13-23 @ 06:26) (127/86 - 127/86)  Lipid Panel: Date/Time: 03-13-23 @ 10:00  Cholesterol, Serum: 152  Direct LDL: --  HDL Cholesterol, Serum: 28  Total Cholesterol/HDL Ration Measurement: --  Triglycerides, Serum: 64

## 2023-03-13 NOTE — BH INPATIENT PSYCHIATRY ASSESSMENT NOTE - NSBHCHARTREVIEWVS_PSY_A_CORE FT
Vital Signs Last 24 Hrs  T(C): 36.5 (03-13-23 @ 06:26), Max: 37.1 (03-12-23 @ 19:18)  T(F): 97.7 (03-13-23 @ 06:26), Max: 98.8 (03-12-23 @ 19:18)  HR: 108 (03-13-23 @ 06:26) (103 - 108)  BP: 127/86 (03-13-23 @ 06:26) (122/82 - 127/86)  BP(mean): --  RR: 16 (03-12-23 @ 19:18) (16 - 18)  SpO2: 97% (03-12-23 @ 15:25) (97% - 97%)    Orthostatic VS  03-12-23 @ 19:18  Lying BP: --/-- HR: --  Sitting BP: 133/96 HR: 106  Standing BP: 133/81 HR: 124  Site: --  Mode: --

## 2023-03-13 NOTE — PSYCHIATRIC REHAB INITIAL EVALUATION - NSBHPRRECOMMEND_PSY_ALL_CORE
Writer met with patient orienting to unit, introduce Psych rehab staff and functions, programming, treatment, milieu therapy, skills coaching, modalities and group/individual therapy. Pt was lethargic, minimally verbal, low volume and cordial; poot eye contact and poor insight into what led up to admission. No past hospitalization. Family hx of Schizophrenia.     Pt was admitted due to  bizarre behavior, paranoia, and disorganization. Pt is disorganized in thought process, tangential and impoverished in content. Pt is minimally verbal. Pt’s ADLS are poor at home, poor sleep and appetite. Poor insight and judgement into symptoms and admission. Regular cannabis use; cut back from daily. Parents are nurses who activated EMS. Pt has not been attending classes at [employer]. Denied SI/HI/AH/VH and janeth.
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
## 2023-03-14 PROCEDURE — 99232 SBSQ HOSP IP/OBS MODERATE 35: CPT

## 2023-03-14 PROCEDURE — 90853 GROUP PSYCHOTHERAPY: CPT

## 2023-03-14 RX ORDER — RISPERIDONE 4 MG/1
2 TABLET ORAL AT BEDTIME
Refills: 0 | Status: DISCONTINUED | OUTPATIENT
Start: 2023-03-14 | End: 2023-03-22

## 2023-03-14 RX ADMIN — Medication 1 MILLIGRAM(S): at 20:27

## 2023-03-14 RX ADMIN — RISPERIDONE 2 MILLIGRAM(S): 4 TABLET ORAL at 20:27

## 2023-03-14 NOTE — CHART NOTE - NSCHARTNOTEFT_GEN_A_CORE
YAZAN Note: Notified by [facility] UR that pt has active ins and auth is needed. Completed precert  [facility] 3/12/23  Ins: B/C ID: [id number]  Spoke to: Lucy  30 days 3/12-4/11  Auth# [id number]  Ins CM not yet assigned

## 2023-03-15 PROCEDURE — 99232 SBSQ HOSP IP/OBS MODERATE 35: CPT

## 2023-03-15 RX ADMIN — RISPERIDONE 2 MILLIGRAM(S): 4 TABLET ORAL at 21:27

## 2023-03-15 RX ADMIN — Medication 2 MILLIGRAM(S): at 21:27

## 2023-03-15 RX ADMIN — Medication 1 MILLIGRAM(S): at 08:23

## 2023-03-16 PROCEDURE — 99231 SBSQ HOSP IP/OBS SF/LOW 25: CPT

## 2023-03-16 RX ADMIN — RISPERIDONE 2 MILLIGRAM(S): 4 TABLET ORAL at 20:15

## 2023-03-16 RX ADMIN — Medication 2 MILLIGRAM(S): at 13:18

## 2023-03-16 RX ADMIN — Medication 2 MILLIGRAM(S): at 20:14

## 2023-03-16 RX ADMIN — Medication 2 MILLIGRAM(S): at 08:52

## 2023-03-16 NOTE — BH PSYCHOLOGY - GROUP THERAPY NOTE - NSPSYCHOLGRPCOGINT_PSY_A_CORE FT
[employer] is an ACT-based group in which patients learn skills and explore themes of identity, compassion, resilience, and connection through the lens of marginalized identity. Group begins with setting group expectations and introductions that focus on identity exploration. Following introductions, the daily theme is presented through both didactics and experiential activities. Group today focused on the theme “connection.” Patients were provided psychoeducation about connection and engaged in discussion about the importance of developing healthy relationships.  led patients in an experiential exercise titled “Taking a Relationship Inventory” and helped patients to connect their values to their relationships.

## 2023-03-17 PROCEDURE — 90853 GROUP PSYCHOTHERAPY: CPT

## 2023-03-17 PROCEDURE — 99232 SBSQ HOSP IP/OBS MODERATE 35: CPT

## 2023-03-17 RX ADMIN — Medication 2 MILLIGRAM(S): at 09:10

## 2023-03-17 RX ADMIN — Medication 1 MILLIGRAM(S): at 13:28

## 2023-03-17 RX ADMIN — RISPERIDONE 2 MILLIGRAM(S): 4 TABLET ORAL at 20:34

## 2023-03-17 RX ADMIN — Medication 2 MILLIGRAM(S): at 20:34

## 2023-03-18 RX ADMIN — Medication 2 MILLIGRAM(S): at 20:24

## 2023-03-18 RX ADMIN — Medication 1 MILLIGRAM(S): at 12:32

## 2023-03-18 RX ADMIN — RISPERIDONE 2 MILLIGRAM(S): 4 TABLET ORAL at 20:22

## 2023-03-18 RX ADMIN — Medication 1 MILLIGRAM(S): at 09:09

## 2023-03-19 RX ADMIN — Medication 2 MILLIGRAM(S): at 20:40

## 2023-03-19 RX ADMIN — Medication 1 MILLIGRAM(S): at 10:13

## 2023-03-19 RX ADMIN — RISPERIDONE 2 MILLIGRAM(S): 4 TABLET ORAL at 20:40

## 2023-03-19 RX ADMIN — Medication 1 MILLIGRAM(S): at 16:37

## 2023-03-20 PROCEDURE — 99232 SBSQ HOSP IP/OBS MODERATE 35: CPT

## 2023-03-20 RX ADMIN — Medication 650 MILLIGRAM(S): at 20:27

## 2023-03-20 RX ADMIN — Medication 1 MILLIGRAM(S): at 20:24

## 2023-03-20 RX ADMIN — Medication 650 MILLIGRAM(S): at 20:57

## 2023-03-20 RX ADMIN — RISPERIDONE 2 MILLIGRAM(S): 4 TABLET ORAL at 20:23

## 2023-03-20 RX ADMIN — Medication 1 MILLIGRAM(S): at 08:58

## 2023-03-21 LAB — GLUCOSE BLDC GLUCOMTR-MCNC: 95 MG/DL — SIGNIFICANT CHANGE UP (ref 70–99)

## 2023-03-21 PROCEDURE — 99232 SBSQ HOSP IP/OBS MODERATE 35: CPT

## 2023-03-21 RX ADMIN — Medication 1 MILLIGRAM(S): at 21:42

## 2023-03-21 RX ADMIN — Medication 650 MILLIGRAM(S): at 20:32

## 2023-03-21 RX ADMIN — RISPERIDONE 2 MILLIGRAM(S): 4 TABLET ORAL at 20:33

## 2023-03-21 RX ADMIN — Medication 1 MILLIGRAM(S): at 20:33

## 2023-03-21 RX ADMIN — Medication 650 MILLIGRAM(S): at 21:02

## 2023-03-21 RX ADMIN — Medication 1 MILLIGRAM(S): at 08:45

## 2023-03-21 NOTE — BH CHART NOTE - NSEVENTNOTEFT_PSY_ALL_CORE
Interval History:  JAY called for bizarre behavior. Patient was seen hopping on chair in dayroom, then went to her room and was re-arranging clothes repeatedly. When nursing staff went to evaluate her, she was lying in bed, with decreased speech production, and with slowed movements. On evaluation, the patient was verbal though whispering with speech latency and with psychomotor slowing. Patient is AOx3. Patient endorses left-sided headache and reports feeling cold. On exam, she exhibits mutism, staring, waxy flexibility, automatic obedience. Pt denies chest pain, SOB, visual changes, confusion, or n/v.       T(C): 36.6 (03-21-23 @ 20:34), Max: 36.6 (03-21-23 @ 20:34)  HR: 86 (03-21-23 @ 08:17) (86 - 86)  BP: 123/76 (03-21-23 @ 08:17) (123/76 - 123/76)  RR: 20 (03-21-23 @ 08:17) (20 - 20)  SpO2: --    Physical Exam:  Gen: Patient sitting on hospital bed, NAD   HEENT: NC/AT,  EOMI.    Resp: CTA b/l. No wheezes, ronchi, or crackles.   CV: Radial pulses 2+ b/l, RRR,  tachycardic, no murmurs.   Abd: soft, NTND.   Ext: ROM intact, 5/5 strength throughout, no tremors. no clubbing, edema, or cyanosis.   Neuro: awake, AOx3.     Assessment:  JAY called for bizarre behavior. On exam, patient exhibits symptoms consistent with catatonia, possibly due to recent reduction in standing Ativan dosage. Patient also found to be tachycardic. Vital signs otherwise stable. At this time, low suspicion for NMS as patient does not exhibit rigidity or hyperthermia. Will order one-time dose of Ativan 1mg PO PRN for catatonia. Tylenol PRN for headache.    Plan:  1. Ativan 1mg PO for catatonia. Tylenol PRN for headache.  2. will continue to monitor routinely.   3. d/w RN staff. Will inform JAY if catatonia symptoms persist.

## 2023-03-22 PROCEDURE — 99232 SBSQ HOSP IP/OBS MODERATE 35: CPT

## 2023-03-22 RX ORDER — RISPERIDONE 4 MG/1
3 TABLET ORAL AT BEDTIME
Refills: 0 | Status: DISCONTINUED | OUTPATIENT
Start: 2023-03-22 | End: 2023-04-05

## 2023-03-22 RX ADMIN — Medication 1 MILLIGRAM(S): at 20:21

## 2023-03-22 RX ADMIN — RISPERIDONE 3 MILLIGRAM(S): 4 TABLET ORAL at 20:21

## 2023-03-22 RX ADMIN — Medication 1 MILLIGRAM(S): at 14:37

## 2023-03-23 PROCEDURE — 90853 GROUP PSYCHOTHERAPY: CPT

## 2023-03-23 PROCEDURE — 99231 SBSQ HOSP IP/OBS SF/LOW 25: CPT

## 2023-03-23 RX ADMIN — RISPERIDONE 3 MILLIGRAM(S): 4 TABLET ORAL at 20:07

## 2023-03-23 RX ADMIN — Medication 1 MILLIGRAM(S): at 20:07

## 2023-03-23 RX ADMIN — Medication 1 MILLIGRAM(S): at 08:56

## 2023-03-24 PROCEDURE — 99231 SBSQ HOSP IP/OBS SF/LOW 25: CPT

## 2023-03-24 RX ADMIN — Medication 1 MILLIGRAM(S): at 20:18

## 2023-03-24 RX ADMIN — Medication 1 MILLIGRAM(S): at 08:53

## 2023-03-24 RX ADMIN — RISPERIDONE 3 MILLIGRAM(S): 4 TABLET ORAL at 20:18

## 2023-03-24 NOTE — BH CHART NOTE - NSEVENTNOTEFT_PSY_ALL_CORE
Family meeting with father Thiago [phone] via [employer]  video call    Discussed course of hospitalization, current clinical status, changes to medication, and plan for continued outpatient care with family member.  Father denies any concerns with the plan for discharge and feels safe with the patient returning home. Denies any guns/lethal weapons are present in the home. Agrees with the plan to follow-up with the outpatient provider for further medication management. Discussed safety planning and to call the outpatient psychiatrist and/or 911 if the patient expresses any danger to self or others in the future.     Family reports the patient has improved significantly with respect to psychotic symptoms, particularly organization and bizarre behavior. He hopes the patient is able to open up more about her emotions.  Discussed features of psychosis, diagnosis of schizophreniform disorder, the prognosis of illness, and importance of medication compliance. Emphasized the importance of abstinence from drugs of abuse, particularly cannabis, given the propensity to worsen psychotic symptoms. Discussed the warning signs of psychotic decompensation and recommended that the family call the outpatient psychiatrist if this occurs. Family understands to call 911 if patient is a danger to himself or others.  Family agrees with the plan for discharge.    Discussed side effects of antipsychotic medication, including sedation, metabolic syndrome, extrapyramidal symptoms, tardive dyskinesia, and neuroleptic malignant syndrome. Recommended abstinence from drugs of abuse and medical consultation prior to starting additional psychiatric medication, given the potential for drug interactions.

## 2023-03-25 RX ORDER — HYDROXYZINE HCL 10 MG
50 TABLET ORAL EVERY 6 HOURS
Refills: 0 | Status: DISCONTINUED | OUTPATIENT
Start: 2023-03-25 | End: 2023-04-07

## 2023-03-25 RX ADMIN — Medication 3 MILLIGRAM(S): at 22:09

## 2023-03-25 RX ADMIN — Medication 1 MILLIGRAM(S): at 20:43

## 2023-03-25 RX ADMIN — Medication 1 MILLIGRAM(S): at 21:26

## 2023-03-25 RX ADMIN — RISPERIDONE 3 MILLIGRAM(S): 4 TABLET ORAL at 20:44

## 2023-03-25 RX ADMIN — Medication 1 MILLIGRAM(S): at 09:00

## 2023-03-26 RX ADMIN — Medication 1 MILLIGRAM(S): at 20:41

## 2023-03-26 RX ADMIN — RISPERIDONE 3 MILLIGRAM(S): 4 TABLET ORAL at 20:41

## 2023-03-26 RX ADMIN — Medication 1 MILLIGRAM(S): at 10:20

## 2023-03-27 PROCEDURE — 99232 SBSQ HOSP IP/OBS MODERATE 35: CPT

## 2023-03-27 RX ADMIN — Medication 1 MILLIGRAM(S): at 08:24

## 2023-03-27 RX ADMIN — RISPERIDONE 3 MILLIGRAM(S): 4 TABLET ORAL at 20:42

## 2023-03-27 RX ADMIN — Medication 1 MILLIGRAM(S): at 20:42

## 2023-03-28 PROCEDURE — 90853 GROUP PSYCHOTHERAPY: CPT

## 2023-03-28 PROCEDURE — 99231 SBSQ HOSP IP/OBS SF/LOW 25: CPT

## 2023-03-28 RX ADMIN — Medication 1 MILLIGRAM(S): at 20:39

## 2023-03-28 RX ADMIN — Medication 650 MILLIGRAM(S): at 23:21

## 2023-03-28 RX ADMIN — Medication 650 MILLIGRAM(S): at 22:21

## 2023-03-28 RX ADMIN — Medication 0.5 MILLIGRAM(S): at 08:04

## 2023-03-28 RX ADMIN — RISPERIDONE 3 MILLIGRAM(S): 4 TABLET ORAL at 20:40

## 2023-03-28 RX ADMIN — Medication 3 MILLIGRAM(S): at 22:21

## 2023-03-29 PROCEDURE — 99231 SBSQ HOSP IP/OBS SF/LOW 25: CPT | Mod: 25

## 2023-03-29 PROCEDURE — 90853 GROUP PSYCHOTHERAPY: CPT

## 2023-03-29 RX ADMIN — RISPERIDONE 3 MILLIGRAM(S): 4 TABLET ORAL at 20:39

## 2023-03-29 RX ADMIN — Medication 0.5 MILLIGRAM(S): at 08:41

## 2023-03-29 RX ADMIN — Medication 1 MILLIGRAM(S): at 20:39

## 2023-03-29 NOTE — BH PSYCHOLOGY - GROUP THERAPY NOTE - NSBHPSYCHOLGRPTYPE_PSY_A_CORE
DBT Life Skills
Cognitive Behavioral Coping Skills
DBT Life Skills

## 2023-03-29 NOTE — BH PSYCHOLOGY - GROUP THERAPY NOTE - NSPSYCHOLGRPDBTINT_PSY_A_CORE FT
taught mindfulness skill 
taught emotion regulation skill 
taught emotion regulation skill of model for describing emotions
taught emotion regulation skill 
taught emotion regulation skill 
taught interpersonal effectiveness skill of FAST
taught emotion regulation skill

## 2023-03-29 NOTE — BH PSYCHOLOGY - GROUP THERAPY NOTE - NSBHPSYCHOLRESPONSE_PSY_A_CORE
Insight displayed
Coping skills acquired/Accepted support
Accepted support

## 2023-03-29 NOTE — BH PSYCHOLOGY - GROUP THERAPY NOTE - TOKEN PULL-DIAGNOSIS
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      
Primary Diagnosis:  Psychosis [F29]        Problem Dx:   Catatonia [F06.1]      
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      
Primary Diagnosis:  Psychosis [F29]        Problem Dx:   Catatonia [F06.1]      
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

## 2023-03-29 NOTE — BH PSYCHOLOGY - GROUP THERAPY NOTE - NSPSYCHOLGRPBILLING_PSY_A_CORE
[id number] - Group Psychotherapy
[id number] - Group Psychotherapy
[id number] - Group Psychotherapy
[id number] - Group Psychotherapy
[id number] - Group Psychotherapy
[id number] - Group Psychotherapy
[id number] - Group Psychotherapy
[id number] - Group Psychotherapy

## 2023-03-29 NOTE — BH PSYCHOLOGY - GROUP THERAPY NOTE - NSPSYCHOLGRPDBTPT_PSY_A_CORE
stable mood and affect in group/no self-destructive impulses/behaviors/other...
Patient unable to identify mood states/Patient unable to participate due to clinical symptoms/other...
stable mood and affect in group/Patient unable to identify mood states/other...
stable mood and affect in group/Patient unable to identify mood states/other...
Patient unable to identify mood states/Patient unable to participate due to clinical symptoms/other...
stable mood and affect in group/no self-destructive impulses/behaviors/other...
stable mood and affect in group/Patient unable to identify mood states/other...

## 2023-03-29 NOTE — BH PSYCHOLOGY - GROUP THERAPY NOTE - NSPSYCHOLGRPDBTGOAL_PSY_A_CORE
reduce mood and affective lability/improve ability to indentify feelings/improve ability to communicate feelings
reduce mood and affective lability/improve ability to indentify feelings/improve ability to communicate feelings/reduce vulnerability to emotional dysregualation
reduce mood and affective lability/improve ability to indentify feelings/improve ability to communicate feelings
reduce mood and affective lability/improve ability to indentify feelings/improve ability to communicate feelings
reduce mood and affective lability/improve ability to indentify feelings/improve ability to communicate feelings/reduce vulnerability to emotional dysregualation
reduce mood and affective lability/improve ability to indentify feelings/improve ability to communicate feelings
reduce mood and affective lability/improve ability to indentify feelings/improve ability to communicate feelings/reduce vulnerability to emotional dysregualation

## 2023-03-29 NOTE — BH PSYCHOLOGY - GROUP THERAPY NOTE - NSBHPSYCHOLASSESSPROV_PSY_A_CORE
Licensed Psychologist
Licensed Psychologist and Psychology Trainee
Licensed Psychologist

## 2023-03-29 NOTE — BH PSYCHOLOGY - GROUP THERAPY NOTE - NSPSYCHOLGRPDBTPT_PSY_A_CORE FT
DBT Group is a group in which patients learn skills to better manage their emotions and behaviors. Group begins with a mindfulness practice so that patients have an opportunity to practice observing their internal and external experiences.  Following the mindfulness exercise the group learns new skills in a didactic format. Group today focused on the “interpersonal effectiveness” module.  Specifically, patients learned “FAST” skills, which are skills to maintain self-respect in relationships through being fair to self and others, not over or under apologizing, sticking to values and being truthful. Patients watched a role play of group leaders effectively and non-effectively using skills, and were asked to reflect on use of the skills. Patients also challenged their worry thoughts that come up in interpersonal relationships by coming up with new wise mind self statements.  
DBT Group is a group in which patients learn skills to better manage their emotions and behaviors. Group begins with a mindfulness practice so that patients have an opportunity to practice observing their internal and external experiences.  Following the mindfulness exercise the group learns new skills in a didactic format. Group today focused on the “emotion regulation” module.  Specifically, patients learned the skill of Accumulating Positive Emotions in the Long Term by identifying values and translating those into goals and manageable action steps. Patients shared values that are important to them and how these translate into goals, as well as how they’ve begun to implement these. 
DBT Group is a group in which patients learn skills to better manage their emotions and behaviors. Group begins with a mindfulness practice so that patients have an opportunity to practice observing their internal and external experiences.  Following the mindfulness exercise the group learns new skills in a didactic format. Group today focused on the “emotion regulation” module.  Specifically, patients learned Build Mastery and Blackwood Ahead and Blackwood Ahead. Patients were asked to identify an activity to engage in every day that would help increase their sense of competence and accomplishment (Build Mastery). They were also asked to identify potential difficult situations, identify skills to help manage these difficulties, and imagine coping effectively (Blackwood Ahead). 
DBT Group is a group in which patients learn skills to better manage their emotions and behaviors. Group begins with a mindfulness practice so that patients have an opportunity to practice observing their internal and external experiences.  Following the mindfulness exercise the group learns new skills in a didactic format. Group today focused on the “emotion regulation” module.  Specifically, patients learned the skills of “check the facts,” and “opposite action.” “Check the facts” is about being more realistic about interpretations and assumptions and challenging them appropriately to think more rationally, and “opposite action” involves acting opposite to problematic urges that come with emotions.  provided an example of checking the facts, and patients were encouraged to think about how these skills, and were assigned homework to practice. 
DBT Group is a group in which patients learn skills to better manage their emotions and behaviors. Group begins with a mindfulness practice so that patients have an opportunity to practice observing their internal and external experiences.  Following the mindfulness exercise the group learns new skills in a didactic format. Group today focused on the “emotion regulation” module.  Specifically, patients learned about the Model for Describing Emotions, which explains the different components of an emotion, including prompting event, vulnerability factors, interpretations, biological changes and actions. Patients were also taught various techniques for changing the trajectory of an emotion through mindful awareness, problem solving, check the facts and opposite action.  
DBT Group is a group in which patients learn skills to better manage their emotions and behaviors. Group begins with a mindfulness practice so that patients have an opportunity to practice observing their internal and external experiences.  Following the mindfulness exercise the group learns new skills in a didactic format. Group today focused on the “emotion regulation” module.  Specifically, patients learned the skills of “PLEASE,” or taking care of the mind by taking care of the body. This skill involves maintaining consistent treatment for physical illness, balanced eating, avoidance of mood-altering substances, balanced sleep, and exercise.  provided examples and suggestions of ways to improve these areas, and patients were encouraged to engage in discussion of ways they can prioritize and implement this skill.
DBT Group is a group in which patients learn skills to better manage their emotions and behaviors. Group today focused on the “mindfulness” module, which are skills to help patients increase their awareness of their present experience without judgment. Pts were taught the “what” skills (observe, describe, participate) and “how” skills (non-judgmentally, effectively, and one-mindfully) of mindfulness, and engaged in a variety of mindfulness exercises to practice the skills, and shared observations at the end of each activity.

## 2023-03-29 NOTE — BH PSYCHOLOGY - GROUP THERAPY NOTE - NSPSYCHOLGRPDBTINTR_PSY_A_CORE FT
na
taught emotion regulation skill of model for describing emotions
na
taught interpersonal effectiveness skill of FAST

## 2023-03-29 NOTE — BH PSYCHOLOGY - GROUP THERAPY NOTE - NSBHPTASSESSDT_PSY_A_CORE
23-Mar-2023 11:00
27-Mar-2023 11:15
14-Mar-2023 11:15
16-Mar-2023 11:15
16-Mar-2023 15:15
17-Mar-2023 11:15
28-Mar-2023 11:15
20-Mar-2023 11:15

## 2023-03-29 NOTE — BH PSYCHOLOGY - GROUP THERAPY NOTE - NSBHPSYCHOLPARTICIP_PSY_A_CORE
Partially engaged
Fully engaged
Fully engaged
Partially engaged
Partially engaged
Fully engaged
Partially engaged
Fully engaged

## 2023-03-30 PROCEDURE — 99232 SBSQ HOSP IP/OBS MODERATE 35: CPT

## 2023-03-30 RX ADMIN — Medication 1 MILLIGRAM(S): at 20:16

## 2023-03-30 RX ADMIN — Medication 0.5 MILLIGRAM(S): at 08:53

## 2023-03-30 RX ADMIN — RISPERIDONE 3 MILLIGRAM(S): 4 TABLET ORAL at 20:16

## 2023-03-31 PROCEDURE — 99231 SBSQ HOSP IP/OBS SF/LOW 25: CPT

## 2023-03-31 RX ADMIN — Medication 650 MILLIGRAM(S): at 22:25

## 2023-03-31 RX ADMIN — Medication 3 MILLIGRAM(S): at 22:28

## 2023-03-31 RX ADMIN — Medication 1 MILLIGRAM(S): at 20:26

## 2023-03-31 RX ADMIN — RISPERIDONE 3 MILLIGRAM(S): 4 TABLET ORAL at 20:26

## 2023-04-01 RX ADMIN — RISPERIDONE 3 MILLIGRAM(S): 4 TABLET ORAL at 21:32

## 2023-04-01 RX ADMIN — Medication 650 MILLIGRAM(S): at 23:45

## 2023-04-01 RX ADMIN — Medication 1 MILLIGRAM(S): at 21:32

## 2023-04-02 RX ORDER — HALOPERIDOL DECANOATE 100 MG/ML
2 INJECTION INTRAMUSCULAR ONCE
Refills: 0 | Status: COMPLETED | OUTPATIENT
Start: 2023-04-02 | End: 2023-04-02

## 2023-04-02 RX ORDER — DIPHENHYDRAMINE HCL 50 MG
50 CAPSULE ORAL ONCE
Refills: 0 | Status: COMPLETED | OUTPATIENT
Start: 2023-04-02 | End: 2023-04-02

## 2023-04-02 RX ORDER — HALOPERIDOL DECANOATE 100 MG/ML
5 INJECTION INTRAMUSCULAR ONCE
Refills: 0 | Status: COMPLETED | OUTPATIENT
Start: 2023-04-02 | End: 2023-04-02

## 2023-04-02 RX ADMIN — Medication 50 MILLIGRAM(S): at 18:30

## 2023-04-02 RX ADMIN — Medication 650 MILLIGRAM(S): at 00:30

## 2023-04-02 RX ADMIN — HALOPERIDOL DECANOATE 2 MILLIGRAM(S): 100 INJECTION INTRAMUSCULAR at 18:30

## 2023-04-02 RX ADMIN — HALOPERIDOL DECANOATE 5 MILLIGRAM(S): 100 INJECTION INTRAMUSCULAR at 18:30

## 2023-04-02 NOTE — BH CHART NOTE - NSEVENTNOTEFT_PSY_ALL_CORE
[facility] called at 6:13PM after patient became suddenly very agitated during visiting hours. Staff reports patient suddenly started screaming and threatening visitors and staff, reports this is new behavior for patient and has not acted like this in the past. JAY activated IM Haldol 5mg, Ativan 2mg, and benadryl 50mg as per orders. Placed in seclusion at 6:09. Patient placed in manual hold from _ to __, and then placed in 5 point restraint at ___.    Gen: Patient placed comfortably in restraints, NAD despite attempting to get out of restraints  HEENT: NC/AT,EOMI.  Resp: Breathing comfortably, nonlabored   Ext: Restraints placed appropriately on all extremities (able to easily fit 2 fingers under each restraint). No clubbing, edema, or cyanosis. 5/5 strength throughout all extremities. 2+ pulses of all extremities.  Neuro: awake, alert, grossly oriented.    Plan:  -  [facility] called at 6:13PM after patient became suddenly very agitated during visiting hours. Staff reports patient suddenly started screaming and threatening visitors and staff, reports this is new behavior for patient and has not acted like this in the past. JAY activated IM Haldol 5mg, Ativan 2mg, and benadryl 50mg as per orders. Placed in seclusion at 6:09. Patient placed in manual hold from _ to __, and then placed in 5 point restraint at ___.    After restraint placement, patient physical exam completed. Pt placed in restraints comfortably. Previous prn medication with effect, patient denies physical pain or complaints. Continues to be aggressive and agitated and screaming at staff.    Gen: Patient placed comfortably in restraints, NAD despite yelling and screaming  HEENT: NC/AT,EOMI.  Resp: Breathing comfortably, nonlabored   Ext: Restraints placed appropriately on all extremities (able to easily fit 2 fingers under each restraint). No clubbing, edema, or cyanosis. 5/5 strength throughout all extremities. 2+ pulses of all extremities.  Neuro: awake, alert, grossly oriented.      A/P:  Patient required IM meds after becoming agitated on the unit, 5-point restraints for continued agitation, aggression and threatening behavior. Pt placed comfortably in restraints, clinically stable with exam otherwise unremarkable.    1.will c/w 5-point restraints for threat of harm to self/others. Release patient in shortest time possible.  2. Vitals q2h, will reassess clinically qh for need to continue restraints.  3. d/w RN staff who agree with plan. [facility] called at 6:13PM after patient became suddenly very agitated during visiting hours. Staff reports patient suddenly started screaming and threatening visitors and staff, reports this is new behavior for patient and has not acted like this in the past. JAY activated IM Haldol 5mg, Ativan 2mg, and benadryl 50mg as per orders. Placed in seclusion at 6:09 until 6:25PM. Patient placed in manual hold from 6:25 to 6:30PM, and then placed in 5 point restraint at 6:30PM.    After restraint placement, patient physical exam completed. Pt placed in restraints comfortably. Previous prn medication with effect, patient denies physical pain or complaints. Continues to be aggressive and agitated and screaming at staff.    Gen: Patient placed comfortably in restraints, NAD despite yelling and screaming  HEENT: NC/AT,EOMI.  Resp: Breathing comfortably, nonlabored   Ext: Restraints placed appropriately on all extremities (able to easily fit 2 fingers under each restraint). No clubbing, edema, or cyanosis. 5/5 strength throughout all extremities. 2+ pulses of all extremities.  Neuro: awake, alert, grossly oriented.      A/P:  Patient required IM meds after becoming agitated on the unit, 5-point restraints for continued agitation, aggression and threatening behavior. Pt placed comfortably in restraints, clinically stable with exam otherwise unremarkable.    1.will c/w 5-point restraints for threat of harm to self/others. Release patient in shortest time possible.  2. Vitals q2h, will reassess clinically qh for need to continue restraints.  3. d/w RN staff who agree with plan.

## 2023-04-03 PROCEDURE — 99231 SBSQ HOSP IP/OBS SF/LOW 25: CPT

## 2023-04-03 RX ADMIN — RISPERIDONE 3 MILLIGRAM(S): 4 TABLET ORAL at 20:51

## 2023-04-03 RX ADMIN — Medication 1 MILLIGRAM(S): at 20:52

## 2023-04-03 NOTE — DIETITIAN INITIAL EVALUATION ADULT - OTHER INFO
Patient is a 22-year-old female with no past psychiatric history brought in by EMS activated by parents for psychosis, which began in December and worsened over the past weeks. Pt with poor ADL's at home not eating for 3 days PTA. No pertinent medical history.  Writer attempted to speak to Pt but she is not attentive and is quiet with no eye contact. Per RN, Pt not doing well today, poor po intake this am., but usually fair po intake. No GI distress noted. Pt will benefit from Ensure plus HP x two daily (700 kcal and 40 g protein) Allergy to eggs and nuts.

## 2023-04-03 NOTE — BH INPATIENT PSYCHIATRY PROGRESS NOTE - MSE UNSTRUCTURED FT
On exam today the patient is irritable and only superficially cooperative.    Speech is clear and of normal rate.    Thought process: generally linear and goal directed.    Thought content: with vague paranoia but guarded and refuses to elaborate.   Perception: Denies hearing voices or other perceptual disturbances (becomes upset when asked)   Mood: Describes as "OK".   Affect: flat.    Patient denies active suicidal ideation, intention and plan.    Patient denies active aggressive/homicidal ideation, intent or plan.   Patient is Alert and oriented .   Fund of knowledge is fair. Memory is intact  Insight and judgment are fair.   Impulse control is intact at this time.    Vital signs are stable.

## 2023-04-03 NOTE — DIETITIAN INITIAL EVALUATION ADULT - PERTINENT MEDS FT
MEDICATIONS  (STANDING):  LORazepam     Tablet 1 milliGRAM(s) Oral at bedtime  [id number]   Tablet 3 milliGRAM(s) Oral at bedtime

## 2023-04-04 PROCEDURE — 99231 SBSQ HOSP IP/OBS SF/LOW 25: CPT

## 2023-04-04 RX ADMIN — Medication 1 MILLIGRAM(S): at 20:34

## 2023-04-04 RX ADMIN — RISPERIDONE 3 MILLIGRAM(S): 4 TABLET ORAL at 20:34

## 2023-04-04 RX ADMIN — Medication 50 MILLIGRAM(S): at 17:57

## 2023-04-05 PROCEDURE — 99232 SBSQ HOSP IP/OBS MODERATE 35: CPT

## 2023-04-05 RX ORDER — RISPERIDONE 4 MG/1
4 TABLET ORAL AT BEDTIME
Refills: 0 | Status: DISCONTINUED | OUTPATIENT
Start: 2023-04-05 | End: 2023-04-07

## 2023-04-05 RX ADMIN — Medication 1 MILLIGRAM(S): at 20:36

## 2023-04-05 RX ADMIN — Medication 0.5 MILLIGRAM(S): at 12:20

## 2023-04-05 RX ADMIN — RISPERIDONE 4 MILLIGRAM(S): 4 TABLET ORAL at 20:35

## 2023-04-06 PROCEDURE — 99231 SBSQ HOSP IP/OBS SF/LOW 25: CPT

## 2023-04-06 RX ORDER — RISPERIDONE 4 MG/1
1 TABLET ORAL
Qty: 30 | Refills: 0
Start: 2023-04-06 | End: 2023-05-05

## 2023-04-06 RX ORDER — LANOLIN ALCOHOL/MO/W.PET/CERES
1 CREAM (GRAM) TOPICAL
Qty: 0 | Refills: 0 | DISCHARGE
Start: 2023-04-06

## 2023-04-06 RX ADMIN — Medication 3 MILLIGRAM(S): at 20:45

## 2023-04-06 RX ADMIN — Medication 0.5 MILLIGRAM(S): at 09:04

## 2023-04-06 RX ADMIN — RISPERIDONE 4 MILLIGRAM(S): 4 TABLET ORAL at 20:43

## 2023-04-06 RX ADMIN — Medication 1 MILLIGRAM(S): at 20:43

## 2023-04-06 NOTE — BH TREATMENT PLAN - NSTXPLANTHERAPYSESSIONSFT_PSY_ALL_CORE
03-27-23  Type of therapy: Dialectical behavior therapy,Creative arts therapy,Leisure development,Peer advocate,Psychoeducation  Type of session: Individual  Level of patient participation: Attentive,Engaged,Participates  Duration of participation: 15 minutes  Therapy conducted by: Psych rehab  Therapy Summary: Writer met with patient to assess her progress towards her established psychiatric rehabilitation goal over the past seven days. Patient was receptive and cooperative to engage with writer. Patient and writer discussed regarding her coping skills and group progress. Patient reported that she has been utilizing art as her main coping skill to ground herself. Patient was noted to present with improved symptoms and insight evidenced by her less disorganized thought process. During groups, patient was noted to be somewhat circumstantial, yet remained relevant in topic. Patient attended majority of DBT and psychiatric rehabilitation groups in the unit. Patient participated meaningfully. Patient has been moderately visible in the milieu and social with select peers in the unit. Patient denied SI, HI, AH, and VH. Psychiatric rehabilitation team will continue to engage with patient daily and assist with coping skills development and symptom management.  
  04-03-23  Type of therapy: Coping skills,Creative arts therapy,Mindfulness,Music therapy,Pet therapy,Psychoeducation,Stress management,Symptom management  Type of session: Individual  Level of patient participation: Engaged  Duration of participation: 30 minutes  Therapy conducted by: Psych rehab  Therapy Summary: Writer attempted to meet with pt several times but was not amenable to interview. Pt was asleep and refused the other times.  Pt has been agitated yesterday, verbally abusive, combative, and was in restraints/seclusion room. Pt appeared psychotic per collateral from nurses. Affect is restricted; Mood is irritable. Pt has been sleeping in her room frequently the past week.      Patient reported that she has been utilizing art as her main coping skill to ground herself. Patient was noted to present with improved symptoms and insight evidenced by her less disorganized thought process. During groups, patient was noted to be somewhat tangential and irritable. Patient attended minimal psychiatric rehabilitation groups in the unit past week. Patient has been moderately visible in the milieu and social with select peers in the unit. Patient denied SI, HI, AH, and VH. Psychiatric rehabilitation team will continue to engage with patient daily and assist with coping skills development and symptom management. Pt participated in pet therapy yesterday.  
  03-20-23  Type of therapy: Dialectical behavior therapy,Leisure development,Psychoeducation  Type of session: Individual  Level of patient participation: Attentive,Engaged,Participates  Duration of participation: 20 min  Therapy conducted by: Psych rehab  Therapy Summary: Writer met with patient to assess her progress towards her established psychiatric rehabilitation goal over the past seven days. Patient was receptive to engage with writer for individual session in private setting. Throughout the session, patient presented with flat and constricted affect with tangential speech. Additionally, patient maintained minimal eye contact. Patient endorsed concerns for uncertainty around discharge date. Writer delivered treatment team’s intention to monitor patient’s safety. Patient was somewhat receptive. Patient was tearful when expressing feelings of overwhelming responsibilities at home. Writer provided validations on feeling stressed and overwhelmed when having to take care of multiple responsibilities all at once. Writer provided psychoeducation on utilizing support system and adequate self-care to manage stress. Patient was somewhat receptive. Patient continued to be tearful when sharing that she struggles with “mood,” not psychosis. Patient stated repeatedly that she is not “psychotic.” Writer attempted to explore symptoms that she experienced and validated on her feelings surrounding the diagnosis (i.e. mainly confusion). Patient self-reported that she has been attending groups and engaging in active art making for coping. Writer provided ample amount of reassurance and praise regarding her ability to utilize coping skills to manage stress and anxiety. During the session, patient was noted to struggle with absorbing and understanding new information as patient repeatedly asked same question. Per staff collateral, patient has been moderately visible in the milieu and social with select peers in the unit. Patient denied SI, HI, AH, and VH. Patient has been adherent to medication. Psychiatric rehabilitation team will continue to engage with patient daily and provide therapeutic support throughout her inpatient stay.

## 2023-04-06 NOTE — BH INPATIENT PSYCHIATRY DISCHARGE NOTE - YES NO FOR MLM POSITIVE OR NEGATIVE COVID RESULT
, Quality 226: Preventive Care And Screening: Tobacco Use: Screening And Cessation Intervention: Patient screened for tobacco use and is an ex/non-smoker Detail Level: Detailed

## 2023-04-06 NOTE — BH DISCHARGE NOTE NURSING/SOCIAL WORK/PSYCH REHAB - DISCHARGE INSTRUCTIONS AFTERCARE APPOINTMENTS
In order to check the location, date, or time of your aftercare appointment, please refer to your Discharge Instructions Document given to you upon leaving the hospital.  If you have lost the instructions please call [phone]

## 2023-04-06 NOTE — BH INPATIENT PSYCHIATRY DISCHARGE NOTE - NSBHMETABOLIC_PSY_ALL_CORE_FT
BMI:   HbA1c: A1C with Estimated Average Glucose Result: 4.7 % (03-13-23 @ 10:00)    Glucose: POCT Blood Glucose.: 95 mg/dL (03-21-23 @ 21:12)    BP: 122/68 (04-06-23 @ 07:46) (101/73 - 122/68)  Lipid Panel: Date/Time: 03-13-23 @ 10:00  Cholesterol, Serum: 152  Direct LDL: --  HDL Cholesterol, Serum: 28  Total Cholesterol/HDL Ration Measurement: --  Triglycerides, Serum: 64

## 2023-04-06 NOTE — BH TREATMENT PLAN - NSBHPRIMARYDX_PSY_ALL_CORE
Schizophreniform disorder    
Psychosis    
Schizophreniform disorder    
Schizophreniform disorder

## 2023-04-06 NOTE — BH TREATMENT PLAN - NSTXCAREGIVERPARTICIPATE_PSY_P_CORE
Family/Caregiver participated in identification of needs/problems/goals for treatment

## 2023-04-06 NOTE — BH DISCHARGE NOTE NURSING/SOCIAL WORK/PSYCH REHAB - NSDCPRGOAL_PSY_ALL_CORE
During the current hospitalization, patient has been addressing psychiatric rehabilitation goals pertaining to identifying coping skills that assist with organizing. Patient has demonstrated progress towards psychiatric rehabilitation goals during the current hospitalization. Patient exhibited progress through participating in individual and group therapy and developing additional coping skills to assist with managing negative emotions such as deep breathing, art therapy, stretching, and DBT skills. Writer encouraged patient to continue to strengthen and practice effective skills. Patient was receptive. Patient attended approximately 92 percent of psychiatric rehabilitation groups. Patient met goal of identifying coping skills by reporting these skills have helped her during current hospitalization. Patient reports she will continue to practice coping skills. Patient reports she will work on not using substances as she recognizes it impacts the way she feels mentally and physically. Writer provided encouragement and validation. Patient was receptive. Patient reports overall improvement in mood. Patient reports feeling "happy" to go home. Patient reports she is looking forward to seeing her family. Writer and patient engaged in safety planning. Patient was compliant with medications during current hospitalization. Patient was visible on the unit and was appropriate with peers and staff. Patient was provided with a Press Ganey survey prior to discharge.

## 2023-04-06 NOTE — BH DISCHARGE NOTE NURSING/SOCIAL WORK/PSYCH REHAB - NSDCPRRECOMMEND_PSY_ALL_CORE
Pt would benefit from following up with [facility] Partial program for medication management, structure, socialization and therapy.

## 2023-04-06 NOTE — BH TREATMENT PLAN - NSTXDISORGINTERRN_PSY_ALL_CORE
Assess thought process, provide redirection as needed, and maintain patient safety.

## 2023-04-06 NOTE — BH TREATMENT PLAN - NSTXPSYCHOINTERMD_PSY_ALL_CORE
Psychopharmacology  Psychoeducation

## 2023-04-06 NOTE — BH TREATMENT PLAN - NSTXDCOPLKDATENEW_PSY_ALL_CORE
DATE:  05/20/2017      CONSULTANT:  Maddie Stearns M.D.      HISTORY OF PRESENT ILLNESS:  The patient is a 66-year-old white male, who has   been drinking and falling apparently quite often.  Computerized tomographic   scanning of the brain reveals subacute and chronic subdural hematoma about the   left convexity with no significant midline shift or mass effect.  The thickness   is under 1 cm, but it is extensive from front to back in his cranium on the left   side.  He was drinking, fell, had a loss of consciousness, and has some   abrasions on his scalp.  He is awake, alert, oriented x3, and moves all 4   extremities quite effectively.  Full range of extraocular muscle movements   appreciated.  He appears to be in no acute distress and he has no complaints of   headache.      ASSESSMENT AND PLAN:  It is my feeling, the patient has a significant alcohol   problem and needs counseling concerning that issue.  He also should be followed   with serial scans and he has been cautioned as to the next fall may be a fatal   one due to the fact that he already has a thin subdural hematoma in his cranium.   The risks and indications of further trauma have been discussed in detail and we   discussed with the nurse and the Trauma Service that is necessary for him to be   observed and we will see him in 7-10 days.  Also, alcoholic rehab would seem to   be appropriate in his setting.      We appreciate this consultation.  We will follow carefully with you.         _____________________               ____________________________________   DATE AND TIME                       Maddie Stearns M.D.         /SURINDER-#[id number]   DD:  05/20/2017 12:43:14      DT:  05/20/2017 16:38:45      cc:   Matt Landers MD (Res)            [facility]      CONSULTATION                MAICOL NIEVES   MRN#: [id number]   ROOM: [address] [place] [address]   ACCT#: [id number]     
27-Mar-2023

## 2023-04-06 NOTE — BH INPATIENT PSYCHIATRY DISCHARGE NOTE - NSDCMRMEDTOKEN_GEN_ALL_CORE_FT
LORazepam 0.5 mg oral tablet: 1 tab(s) orally once a day MDD: 1.5 mg  LORazepam 1 mg oral tablet: 1 tab(s) orally once a day (at bedtime) MDD: 1.5 mg  melatonin 3 mg oral tablet: 1 tab(s) orally once a day (at bedtime) As needed Insomnia  risperiDONE 4 mg oral tablet: 1 tab(s) orally once a day (at bedtime)

## 2023-04-06 NOTE — BH SAFETY PLAN - WARNING SIGN 2
Outpatient Treatment Plan      : Pavel  Clinic: [facility]  Address: [address]  [place] [address]  Phone: [phone]    Medication Management: Megan MARIO  Appt: February 23rd please arrive between the times of 7:30am to 2:30pm  Clinic: [facility]  Address: [address]  [place] [address]  Phone: [phone]    Primary Care Physician:  You have stated that you will connect with a PCP once your established    Disposition at discharge:    You have stated that you will return back to your place of residency (address on facesheet)    Transportation:  [facility], [phone], you can use your insurance for rides to medical appointments as long as you call at least 2 business days in advance to reserve the ride.       If you are unable to keep your appointments, call the provider 24hrs before to set up another appointment.       Crisis Survival Plan      Patient declined []      Coping strategies/activities I will try if I have negative or harmful thoughts:     1. Watching TV     2. Listen to music     3. Take bus ride to see places      I will call the following people for support or distraction:     1. Name: Jessy Walsh-Therapist        Phone number: Patient has contact information     2. Name: Jolene Ruiz-Grandmother              Phone number: [phone]     3. Crisis Line: [phone] or Suicide Prevention Lifeline: 1-383.714.9130     4. Warmline: [phone]     5. Crisis Resource Center: [address] [phone]     6. [facility] Intake Department: [phone]                  [facility] Intake Department: [phone]     7. Call your Psychiatrist or Therapist. See phone numbers listed above.     8. Impact 211: Dial [phone] if '211' does not work on cell phone.      Text: [phone]        In an emergency call 911          Patient Signature: _______________________________________Date:  __________     I withdraw socially
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
## 2023-04-06 NOTE — BH TREATMENT PLAN - NSTXPSYCHOGOAL_PSY_ALL_CORE
Will identify 2 coping skills that assist with focus on reality

## 2023-04-06 NOTE — BH TREATMENT PLAN - NSDCCRITERIA_PSY_ALL_CORE
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

## 2023-04-06 NOTE — BH SAFETY PLAN - WARNING SIGN 3
HPI:       82 year old patient who presents with:    Management of progressing dementia.  The patient presents today with his wife and son.  They have made plans to move him to a memory care unit in the next couple of weeks.  They would like to simplify his medical regimen.    His agitated behaviors have largely stabilized on risperidone 0.125 mg in the morning and 0.25 mg in the evening.  The patient is mostly mute but states that he feels well.         Problem, Medication and Allergy Lists were reviewed and are current.  Patient is an established patient of this clinic.         Review of Systems:     Review of Systems   Constitutional: Negative for activity change, appetite change, fever and unexpected weight change.   Psychiatric/Behavioral: Positive for agitation, behavioral problems, confusion and sleep disturbance (anxious). Negative for dysphoric mood. The patient is nervous/anxious and is hyperactive.           Physical Exam:     /66   Pulse 72   Resp 16   Wt 68 kg (150 lb)   SpO2 98%   BMI 23.00 kg/m      Body mass index is 23 kg/m .  Vitals reviewed.    Physical Exam   Constitutional: He appears well-nourished. No distress.   Neck: No JVD present. No thyromegaly present.   Cardiovascular: Normal rate, regular rhythm and intact distal pulses. Exam reveals no friction rub.   Murmur (3/6 CLINT aortic) heard.  Pulmonary/Chest: Effort normal and breath sounds normal. He has no wheezes.   Lymphadenopathy:     He has no cervical adenopathy.   Neurological: He is alert.   Skin: He is not diaphoretic.   Psychiatric:   + memory loss.  Speaks very little.  Alert but unable to follow conversation.   Vitals reviewed.          Results:     Results from last visit:  Office Visit on 03/20/2019   Component Date Value Ref Range Status     Hemoglobin A1C 03/20/2019 6.9* 4.1 - 5.7 % Final     TSH 03/20/2019 1.25  0.40 - 4.00 mU/L Final     WBC 03/20/2019 6.1  4.0 - 11.0 K/uL Final     Lymphocytes # 03/20/2019  1.3  0.8 - 5.3 K/uL Final     % Lymphocytes 03/20/2019 21.2  20.0 - 48.0 %L Final     Mid # 03/20/2019 0.5  0.0 - 2.2 K/uL Final     Mid % 03/20/2019 7.6  0.0 - 20.0 %M Final     GRANULOCYTES # 03/20/2019 4.3  1.6 - 8.3 K/uL Final     % Granulocytes 03/20/2019 71.2  40.0 - 75.0 %G Final     RBC 03/20/2019 3.96* 4.40 - 5.90 M/uL Final     Hemoglobin 03/20/2019 12.1* 13.3 - 17.7 g/dL Final     Hematocrit 03/20/2019 41.7  40.0 - 53.0 % Final     MCV 03/20/2019 105.3* 78.0 - 100.0 fL Final     MCH 03/20/2019 30.6  26.5 - 35.0 pg Final     MCHC 03/20/2019 29.0* 32.0 - 36.0 g/dL Final     Platelets 03/20/2019 216.0  150.0 - 450.0 K/uL Final     CRP Inflammation 03/20/2019 <2.9  0.0 - 8.0 mg/L Final     Sodium 03/20/2019 141  133 - 144 mmol/L Final     Potassium 03/20/2019 4.2  3.4 - 5.3 mmol/L Final     Chloride 03/20/2019 105  94 - 109 mmol/L Final     Carbon Dioxide 03/20/2019 27  20 - 32 mmol/L Final     Anion Gap 03/20/2019 9  3 - 14 mmol/L Final     Glucose 03/20/2019 133* 70 - 99 mg/dL Final     Urea Nitrogen 03/20/2019 17  7 - 30 mg/dL Final     Creatinine 03/20/2019 0.73  0.66 - 1.25 mg/dL Final     GFR Estimate 03/20/2019 86  >60 mL/min/[1.73_m2] Final    Comment: Non  GFR Calc  Starting 12/18/2018, serum creatinine based estimated GFR (eGFR) will be   calculated using the Chronic Kidney Disease Epidemiology Collaboration   (CKD-EPI) equation.       GFR Estimate If Black 03/20/2019 >90  >60 mL/min/[1.73_m2] Final    Comment:  GFR Calc  Starting 12/18/2018, serum creatinine based estimated GFR (eGFR) will be   calculated using the Chronic Kidney Disease Epidemiology Collaboration   (CKD-EPI) equation.       Calcium 03/20/2019 9.8  8.5 - 10.1 mg/dL Final     Bilirubin Total 03/20/2019 0.3  0.2 - 1.3 mg/dL Final     Albumin 03/20/2019 3.9  3.4 - 5.0 g/dL Final     Protein Total 03/20/2019 7.3  6.8 - 8.8 g/dL Final     Alkaline Phosphatase 03/20/2019 72  40 - 150 U/L Final     ALT  03/20/2019 30  0 - 70 U/L Final     AST 03/20/2019 20  0 - 45 U/L Final     Sed Rate 03/20/2019 10  0 - 20 mm/h Final     Assessment and Plan     Jaydon Romero was seen today for recheck.    Diagnoses and all orders for this visit:    Late onset Alzheimer's disease with behavioral disturbance.  I spent 20 minutes of this 25-minute visit in face-to-face counseling with the patient and family regarding his medical regimen and making recommendations to simplify it before he moves into a memory care unit.  All of us reaffirmed that comfort care are the goals of care and that at this point, if he could express himself, he would forego most chronic disease medications.  Accordingly in consultation with the pharmacy team I discontinued several of his medications.  He will stay on one antihypertensive, metformin at a reduced dose, and risperidone at its current dose.    Type 2 diabetes mellitus without complication, without long-term current use of insulin (H).  See above.    Essential hypertension.  We will not be actively targeting a blood pressure goal in accordance with overall patient and family preferences for care.        Options for treatment and follow-up care were reviewed with the patient. Jaydon Romero Nickvaldemar Alegrai engaged in the decision making process and verbalized understanding of the options discussed and agreed with the final plan.    Steffen Bingham MD         Feeling like nobody wants me

## 2023-04-06 NOTE — BH TREATMENT PLAN - NSCMSPTSTRENGTHS_PSY_ALL_CORE
Expressive of emotions/Supportive family
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
## 2023-04-06 NOTE — BH TREATMENT PLAN - NSPTSTATEDGOAL_PSY_ALL_CORE
Patent has been vomiting all morning and is unable to meet with writer.

## 2023-04-06 NOTE — BH INPATIENT PSYCHIATRY DISCHARGE NOTE - DESCRIPTION
lives at home with parents and sister 12yo, parents are nurses, patient was in [employer] getting bachelors in science/psychology

## 2023-04-06 NOTE — BH TREATMENT PLAN - NSTXDISORGINTERPR_PSY_ALL_CORE
Over the past seven days, patient demonstrated some progress towards established psychiatric rehabilitation goal of identifying and utilizing 2 coping skills in organizing thoughts. Patient identified active art making as her coping skill. Psychiatric rehabilitation team will continue to engage with patient daily and assist patient in exploring various coping skills that assist with thought organization.
Over the past seven days, patient demonstrated fair progress towards her treatment goal evidenced by her utilizing coping skills effectively. Patient reported that she has been coloring most of the time in her day. Patient presented with improved insight to her symptoms. Psychiatric rehabilitation team will continue to engage with patient daily and assist patient in skills development.
Pt will continue to identify coping skills to assist with organizing.
Pt will identify and utilize 2 coping skills to assist with organizing.

## 2023-04-06 NOTE — BH DISCHARGE NOTE NURSING/SOCIAL WORK/PSYCH REHAB - NSDCADDINFO1FT_PSY_ALL_CORE
Please be aware that you will be arriving to the first day of partial program on a 8:00AM to 1:00PM schedule. Following first date, your schedule will be 9:00AM to 1:00PM. The  is aware that you would like to be set up with Medicaid transportation.

## 2023-04-06 NOTE — BH SAFETY PLAN - STEP 3 DISTRACTION NAME
Discussed need and strategies for weight loss.  He is aware calorie counting phone application which we previously discussed.  Discussed eliminating sweets and decreasing portion sizes of carbohydrates such as bread, rice, pasta, potatoes.  No sugary drinks such as regular soda or juice.  Also increase activity/exercise.  His goal is around 170 pounds which is quite reasonable.  
No acute findings on exam.  Lab work done last year was unremarkable other than very mild elevation in triglycerides.  This was further discussed.  Immunizations up-to-date.  Seasonal influenza vaccine when available.  1 year follow-up and as needed.  
.

## 2023-04-06 NOTE — BH TREATMENT PLAN - NSTXDCOPLKINTERSW_PSY_ALL_CORE
SW will provide pt with support, discuss aftercare plans, make appropriate referrals for mental health and discuss discharge readiness with team.
Support and psychoed being provided. Patient was referred to [facility] but is not dischargeable at this time.
SW will provide pt with support, discuss aftercare plans, make appropriate referrals for mental health and discuss discharge readiness with team.
Support and psychoed to be provided and all elements of the discharge plan to be arranged.

## 2023-04-06 NOTE — BH DISCHARGE NOTE NURSING/SOCIAL WORK/PSYCH REHAB - PATIENT PORTAL LINK FT
You can access the FollowMyHealth Patient Portal offered by [facility] by registering at the following website: [email or web address]. By joining [facility]’s FollowMyHealth portal, you will also be able to view your health information using other applications (apps) compatible with our system.

## 2023-04-06 NOTE — BH INPATIENT PSYCHIATRY DISCHARGE NOTE - NSDCCPCAREPLAN_GEN_ALL_CORE_FT
PRINCIPAL DISCHARGE DIAGNOSIS  Diagnosis: Schizophreniform disorder  Assessment and Plan of Treatment:

## 2023-04-07 VITALS
HEART RATE: 109 BPM | SYSTOLIC BLOOD PRESSURE: 129 MMHG | TEMPERATURE: 98 F | DIASTOLIC BLOOD PRESSURE: 76 MMHG | RESPIRATION RATE: 18 BRPM

## 2023-04-07 PROCEDURE — 99231 SBSQ HOSP IP/OBS SF/LOW 25: CPT

## 2023-04-07 RX ADMIN — Medication 0.5 MILLIGRAM(S): at 08:44

## 2023-04-07 NOTE — BH INPATIENT PSYCHIATRY PROGRESS NOTE - NSTXDCOPLKPROGRES_PSY_ALL_CORE
Improving
Improving
No Change
No Change
Improving
No Change
No Change
Worsening
Improving
Worsening
Improving

## 2023-04-07 NOTE — BH INPATIENT PSYCHIATRY PROGRESS NOTE - NSBHMSEBEHAV_PSY_A_CORE
Cooperative

## 2023-04-07 NOTE — BH INPATIENT PSYCHIATRY PROGRESS NOTE - NSBHCONSULTIPREASON_PSY_A_CORE
danger to self; mental illness expected to respond to inpatient care

## 2023-04-07 NOTE — BH INPATIENT PSYCHIATRY PROGRESS NOTE - NSBHMSEAFFQUAL_PSY_A_CORE
Euthymic
Depressed
Euthymic
Depressed
Depressed/Irritable
Euthymic
Depressed
Euthymic
Depressed/Irritable
Depressed
Euthymic

## 2023-04-07 NOTE — BH INPATIENT PSYCHIATRY PROGRESS NOTE - NSCGIIMPROVESX_PSY_ALL_CORE
3 = Minimally improved - slightly better with little or no clinically meaningful reduction of symptoms.  Represents very little change in basic clinical status, level of care, or functional capacity.
3 = Minimally improved - slightly better with little or no clinically meaningful reduction of symptoms.  Represents very little change in basic clinical status, level of care, or functional capacity.
2 = Much improved - notably better with signficant reduction of symptoms; increase in the level of functioning but some symptoms remain
2 = Much improved - notably better with signficant reduction of symptoms; increase in the level of functioning but some symptoms remain
3 = Minimally improved - slightly better with little or no clinically meaningful reduction of symptoms.  Represents very little change in basic clinical status, level of care, or functional capacity.
3 = Minimally improved - slightly better with little or no clinically meaningful reduction of symptoms.  Represents very little change in basic clinical status, level of care, or functional capacity.
2 = Much improved - notably better with signficant reduction of symptoms; increase in the level of functioning but some symptoms remain
2 = Much improved - notably better with signficant reduction of symptoms; increase in the level of functioning but some symptoms remain
3 = Minimally improved - slightly better with little or no clinically meaningful reduction of symptoms.  Represents very little change in basic clinical status, level of care, or functional capacity.
2 = Much improved - notably better with signficant reduction of symptoms; increase in the level of functioning but some symptoms remain
4 = No change - symptoms remain essentially unchanged
2 = Much improved - notably better with signficant reduction of symptoms; increase in the level of functioning but some symptoms remain
3 = Minimally improved - slightly better with little or no clinically meaningful reduction of symptoms.  Represents very little change in basic clinical status, level of care, or functional capacity.
2 = Much improved - notably better with signficant reduction of symptoms; increase in the level of functioning but some symptoms remain
2 = Much improved - notably better with signficant reduction of symptoms; increase in the level of functioning but some symptoms remain
3 = Minimally improved - slightly better with little or no clinically meaningful reduction of symptoms.  Represents very little change in basic clinical status, level of care, or functional capacity.
3 = Minimally improved - slightly better with little or no clinically meaningful reduction of symptoms.  Represents very little change in basic clinical status, level of care, or functional capacity.

## 2023-04-07 NOTE — BH INPATIENT PSYCHIATRY PROGRESS NOTE - NSICDXBHPRIMARYDX_PSY_ALL_CORE
Schizophreniform disorder   F20.81  
Psychosis   F29  
Schizophreniform disorder   F20.81  
Psychosis   F29  
Schizophreniform disorder   F20.81  
Psychosis   F29  
Schizophreniform disorder   F20.81  

## 2023-04-07 NOTE — BH INPATIENT PSYCHIATRY PROGRESS NOTE - NSBHATTESTBILLING_PSY_A_CORE
69972-Pxhzjyuupg OBS or IP - low complexity OR 25-34 mins
28844-Cwkrjhyjml OBS or IP - moderate complexity OR 35-49 mins
32233-Kgxvkfzzau OBS or IP - low complexity OR 25-34 mins
65896-Eypmvapwrr OBS or IP - low complexity OR 25-34 mins
79460-Baglotasjk OBS or IP - low complexity OR 25-34 mins
21008-Bdfspjomdn OBS or IP - moderate complexity OR 35-49 mins
29857-Gzreiardhi OBS or IP - low complexity OR 25-34 mins
84402-Ocxqwrprwf OBS or IP - low complexity OR 25-34 mins
41894-Wckvrvkczk OBS or IP - moderate complexity OR 35-49 mins
35019-Kmyvdurlxk OBS or IP - low complexity OR 25-34 mins
26401-Pcgrydmpvj OBS or IP - moderate complexity OR 35-49 mins
98350-Mgsynubeua OBS or IP - low complexity OR 25-34 mins
94160-Jennabvzib OBS or IP - moderate complexity OR 35-49 mins
49739-Ityngczmwv OBS or IP - moderate complexity OR 35-49 mins
53160-Xibaanwmml OBS or IP - moderate complexity OR 35-49 mins
76899-Ozfnsblejw OBS or IP - moderate complexity OR 35-49 mins
45740-Khdrclgpda OBS or IP - moderate complexity OR 35-49 mins
65850-Xenvzsvrms OBS or IP - low complexity OR 25-34 mins
53170-Zyduttqdsl OBS or IP - low complexity OR 25-34 mins

## 2023-04-07 NOTE — BH INPATIENT PSYCHIATRY PROGRESS NOTE - NSTXPROBDCOPLK_PSY_ALL_CORE
DISCHARGE ISSUE - LACK OF APPROPRIATE OUTPATIENT SERVICES

## 2023-04-07 NOTE — BH INPATIENT PSYCHIATRY PROGRESS NOTE - NSBHPSYCHOLCOGORIENT_PSY_A_CORE
Oriented to time, place, person, situation

## 2023-04-07 NOTE — BH INPATIENT PSYCHIATRY PROGRESS NOTE - NSBHMSETHTCONTENT_PSY_A_CORE
Unremarkable
Delusions
Other
Unremarkable
Delusions
Unremarkable

## 2023-04-07 NOTE — BH INPATIENT PSYCHIATRY PROGRESS NOTE - NSICDXBHSECONDARYDX_PSY_ALL_CORE
Catatonia   F06.1  

## 2023-04-07 NOTE — BH INPATIENT PSYCHIATRY PROGRESS NOTE - NSBHMSEKNOWHOW_PSY_ALL_CORE
Current Events/Educational attainment

## 2023-04-07 NOTE — [facility] INPATIENT PSYCHIATRY PROGRESS NOTE - CURRENT MEDICATION
MEDICATIONS  (STANDING):  LORazepam     Tablet 0.5 milliGRAM(s) Oral daily  LORazepam     Tablet 1 milliGRAM(s) Oral at bedtime  risperiDONE   Tablet 3 milliGRAM(s) Oral at bedtime    MEDICATIONS  (PRN):  acetaminophen     Tablet .. 650 milliGRAM(s) Oral every 6 hours PRN Mild Pain (1 - 3), Moderate Pain (4 - 6), Severe Pain (7 - 10)  diphenhydrAMINE 50 milliGRAM(s) Oral every 6 hours PRN agitation  diphenhydrAMINE Injectable 50 milliGRAM(s) IntraMuscular once PRN agtiation  haloperidol     Tablet 2 milliGRAM(s) Oral every 6 hours PRN psychosis  haloperidol    Injectable 2 milliGRAM(s) IntraMuscular once PRN agitation  hydrOXYzine hydrochloride 50 milliGRAM(s) Oral every 6 hours PRN anxiety  LORazepam     Tablet 2 milliGRAM(s) Oral every 6 hours PRN agitation  LORazepam   Injectable 2 milliGRAM(s) IntraMuscular once PRN agitation  melatonin. 3 milliGRAM(s) Oral at bedtime PRN Insomnia  ondansetron   Disintegrating Tablet 8 milliGRAM(s) Oral two times a day PRN Nausea and/or Vomiting  
MEDICATIONS  (STANDING):  LORazepam     Tablet 1 milliGRAM(s) Oral at bedtime  risperiDONE   Tablet 3 milliGRAM(s) Oral at bedtime    MEDICATIONS  (PRN):  acetaminophen     Tablet .. 650 milliGRAM(s) Oral every 6 hours PRN Mild Pain (1 - 3), Moderate Pain (4 - 6), Severe Pain (7 - 10)  diphenhydrAMINE 50 milliGRAM(s) Oral every 6 hours PRN agitation  diphenhydrAMINE Injectable 50 milliGRAM(s) IntraMuscular once PRN agtiation  haloperidol     Tablet 2 milliGRAM(s) Oral every 6 hours PRN psychosis  haloperidol    Injectable 2 milliGRAM(s) IntraMuscular once PRN agitation  hydrOXYzine hydrochloride 50 milliGRAM(s) Oral every 6 hours PRN anxiety  LORazepam     Tablet 2 milliGRAM(s) Oral every 6 hours PRN agitation  LORazepam   Injectable 2 milliGRAM(s) IntraMuscular once PRN agitation  melatonin. 3 milliGRAM(s) Oral at bedtime PRN Insomnia  ondansetron   Disintegrating Tablet 8 milliGRAM(s) Oral two times a day PRN Nausea and/or Vomiting  
MEDICATIONS  (STANDING):  LORazepam     Tablet 1 milliGRAM(s) Oral two times a day  risperiDONE   Tablet 3 milliGRAM(s) Oral at bedtime    MEDICATIONS  (PRN):  acetaminophen     Tablet .. 650 milliGRAM(s) Oral every 6 hours PRN Mild Pain (1 - 3), Moderate Pain (4 - 6), Severe Pain (7 - 10)  diphenhydrAMINE 50 milliGRAM(s) Oral every 6 hours PRN agitation  diphenhydrAMINE Injectable 50 milliGRAM(s) IntraMuscular once PRN agtiation  haloperidol     Tablet 2 milliGRAM(s) Oral every 6 hours PRN psychosis  haloperidol    Injectable 2 milliGRAM(s) IntraMuscular once PRN agitation  hydrOXYzine hydrochloride 50 milliGRAM(s) Oral every 6 hours PRN anxiety  LORazepam     Tablet 2 milliGRAM(s) Oral every 6 hours PRN agitation  LORazepam   Injectable 2 milliGRAM(s) IntraMuscular once PRN agitation  melatonin. 3 milliGRAM(s) Oral at bedtime PRN Insomnia  ondansetron   Disintegrating Tablet 8 milliGRAM(s) Oral two times a day PRN Nausea and/or Vomiting  
MEDICATIONS  (STANDING):  LORazepam     Tablet 0.5 milliGRAM(s) Oral daily  LORazepam     Tablet 1 milliGRAM(s) Oral at bedtime  risperiDONE   Tablet 4 milliGRAM(s) Oral at bedtime    MEDICATIONS  (PRN):  acetaminophen     Tablet .. 650 milliGRAM(s) Oral every 6 hours PRN Mild Pain (1 - 3), Moderate Pain (4 - 6), Severe Pain (7 - 10)  diphenhydrAMINE 50 milliGRAM(s) Oral every 6 hours PRN agitation  diphenhydrAMINE Injectable 50 milliGRAM(s) IntraMuscular once PRN agtiation  haloperidol     Tablet 2 milliGRAM(s) Oral every 6 hours PRN psychosis  haloperidol    Injectable 2 milliGRAM(s) IntraMuscular once PRN agitation  hydrOXYzine hydrochloride 50 milliGRAM(s) Oral every 6 hours PRN anxiety  LORazepam     Tablet 2 milliGRAM(s) Oral every 6 hours PRN agitation  LORazepam   Injectable 2 milliGRAM(s) IntraMuscular once PRN agitation  melatonin. 3 milliGRAM(s) Oral at bedtime PRN Insomnia  ondansetron   Disintegrating Tablet 8 milliGRAM(s) Oral two times a day PRN Nausea and/or Vomiting  
MEDICATIONS  (STANDING):  LORazepam     Tablet 1 milliGRAM(s) Oral two times a day  risperiDONE   Tablet 3 milliGRAM(s) Oral at bedtime    MEDICATIONS  (PRN):  acetaminophen     Tablet .. 650 milliGRAM(s) Oral every 6 hours PRN Mild Pain (1 - 3), Moderate Pain (4 - 6), Severe Pain (7 - 10)  diphenhydrAMINE 50 milliGRAM(s) Oral every 6 hours PRN agitation  diphenhydrAMINE Injectable 50 milliGRAM(s) IntraMuscular once PRN agtiation  haloperidol     Tablet 2 milliGRAM(s) Oral every 6 hours PRN psychosis  haloperidol    Injectable 2 milliGRAM(s) IntraMuscular once PRN agitation  LORazepam     Tablet 2 milliGRAM(s) Oral every 6 hours PRN agitation  LORazepam   Injectable 2 milliGRAM(s) IntraMuscular once PRN agitation  melatonin. 3 milliGRAM(s) Oral at bedtime PRN Insomnia  ondansetron   Disintegrating Tablet 8 milliGRAM(s) Oral two times a day PRN Nausea and/or Vomiting  
MEDICATIONS  (STANDING):  LORazepam     Tablet 2 milliGRAM(s) Oral three times a day  risperiDONE   Tablet 2 milliGRAM(s) Oral at bedtime    MEDICATIONS  (PRN):  acetaminophen     Tablet .. 650 milliGRAM(s) Oral every 6 hours PRN Mild Pain (1 - 3), Moderate Pain (4 - 6), Severe Pain (7 - 10)  diphenhydrAMINE 50 milliGRAM(s) Oral every 6 hours PRN agitation  diphenhydrAMINE Injectable 50 milliGRAM(s) IntraMuscular once PRN agtiation  haloperidol     Tablet 2 milliGRAM(s) Oral every 6 hours PRN psychosis  haloperidol    Injectable 2 milliGRAM(s) IntraMuscular once PRN agitation  LORazepam     Tablet 2 milliGRAM(s) Oral every 6 hours PRN agitation  LORazepam   Injectable 2 milliGRAM(s) IntraMuscular once PRN agitation  melatonin. 3 milliGRAM(s) Oral at bedtime PRN Insomnia  ondansetron   Disintegrating Tablet 8 milliGRAM(s) Oral two times a day PRN Nausea and/or Vomiting  
MEDICATIONS  (STANDING):  LORazepam     Tablet 1 milliGRAM(s) Oral two times a day  risperiDONE   Tablet 2 milliGRAM(s) Oral at bedtime    MEDICATIONS  (PRN):  acetaminophen     Tablet .. 650 milliGRAM(s) Oral every 6 hours PRN Mild Pain (1 - 3), Moderate Pain (4 - 6), Severe Pain (7 - 10)  diphenhydrAMINE 50 milliGRAM(s) Oral every 6 hours PRN agitation  diphenhydrAMINE Injectable 50 milliGRAM(s) IntraMuscular once PRN agtiation  haloperidol     Tablet 2 milliGRAM(s) Oral every 6 hours PRN psychosis  haloperidol    Injectable 2 milliGRAM(s) IntraMuscular once PRN agitation  LORazepam     Tablet 2 milliGRAM(s) Oral every 6 hours PRN agitation  LORazepam   Injectable 2 milliGRAM(s) IntraMuscular once PRN agitation  melatonin. 3 milliGRAM(s) Oral at bedtime PRN Insomnia  ondansetron   Disintegrating Tablet 8 milliGRAM(s) Oral two times a day PRN Nausea and/or Vomiting  
MEDICATIONS  (STANDING):  LORazepam     Tablet 1 milliGRAM(s) Oral two times a day  risperiDONE   Tablet 3 milliGRAM(s) Oral at bedtime    MEDICATIONS  (PRN):  acetaminophen     Tablet .. 650 milliGRAM(s) Oral every 6 hours PRN Mild Pain (1 - 3), Moderate Pain (4 - 6), Severe Pain (7 - 10)  diphenhydrAMINE 50 milliGRAM(s) Oral every 6 hours PRN agitation  diphenhydrAMINE Injectable 50 milliGRAM(s) IntraMuscular once PRN agtiation  haloperidol     Tablet 2 milliGRAM(s) Oral every 6 hours PRN psychosis  haloperidol    Injectable 2 milliGRAM(s) IntraMuscular once PRN agitation  LORazepam     Tablet 2 milliGRAM(s) Oral every 6 hours PRN agitation  LORazepam   Injectable 2 milliGRAM(s) IntraMuscular once PRN agitation  melatonin. 3 milliGRAM(s) Oral at bedtime PRN Insomnia  ondansetron   Disintegrating Tablet 8 milliGRAM(s) Oral two times a day PRN Nausea and/or Vomiting  
MEDICATIONS  (STANDING):  LORazepam     Tablet 1 milliGRAM(s) Oral at bedtime  risperiDONE   Tablet 2 milliGRAM(s) Oral at bedtime    MEDICATIONS  (PRN):  acetaminophen     Tablet .. 650 milliGRAM(s) Oral every 6 hours PRN Mild Pain (1 - 3), Moderate Pain (4 - 6), Severe Pain (7 - 10)  diphenhydrAMINE 50 milliGRAM(s) Oral every 6 hours PRN agitation  diphenhydrAMINE Injectable 50 milliGRAM(s) IntraMuscular once PRN agtiation  haloperidol     Tablet 2 milliGRAM(s) Oral every 6 hours PRN psychosis  haloperidol    Injectable 2 milliGRAM(s) IntraMuscular once PRN agitation  LORazepam     Tablet 2 milliGRAM(s) Oral every 6 hours PRN agitation  LORazepam   Injectable 2 milliGRAM(s) IntraMuscular once PRN agitation  melatonin. 3 milliGRAM(s) Oral at bedtime PRN Insomnia  ondansetron   Disintegrating Tablet 8 milliGRAM(s) Oral two times a day PRN Nausea and/or Vomiting  
MEDICATIONS  (STANDING):  LORazepam     Tablet 1 milliGRAM(s) Oral at bedtime  LORazepam     Tablet 0.5 milliGRAM(s) Oral daily  risperiDONE   Tablet 4 milliGRAM(s) Oral at bedtime    MEDICATIONS  (PRN):  acetaminophen     Tablet .. 650 milliGRAM(s) Oral every 6 hours PRN Mild Pain (1 - 3), Moderate Pain (4 - 6), Severe Pain (7 - 10)  diphenhydrAMINE 50 milliGRAM(s) Oral every 6 hours PRN agitation  diphenhydrAMINE Injectable 50 milliGRAM(s) IntraMuscular once PRN agtiation  haloperidol     Tablet 2 milliGRAM(s) Oral every 6 hours PRN psychosis  haloperidol    Injectable 2 milliGRAM(s) IntraMuscular once PRN agitation  hydrOXYzine hydrochloride 50 milliGRAM(s) Oral every 6 hours PRN anxiety  LORazepam     Tablet 2 milliGRAM(s) Oral every 6 hours PRN agitation  LORazepam   Injectable 2 milliGRAM(s) IntraMuscular once PRN agitation  melatonin. 3 milliGRAM(s) Oral at bedtime PRN Insomnia  ondansetron   Disintegrating Tablet 8 milliGRAM(s) Oral two times a day PRN Nausea and/or Vomiting  
MEDICATIONS  (STANDING):  LORazepam     Tablet 1 milliGRAM(s) Oral three times a day  risperiDONE   Tablet 2 milliGRAM(s) Oral at bedtime    MEDICATIONS  (PRN):  acetaminophen     Tablet .. 650 milliGRAM(s) Oral every 6 hours PRN Mild Pain (1 - 3), Moderate Pain (4 - 6), Severe Pain (7 - 10)  diphenhydrAMINE 50 milliGRAM(s) Oral every 6 hours PRN agitation  diphenhydrAMINE Injectable 50 milliGRAM(s) IntraMuscular once PRN agtiation  haloperidol     Tablet 2 milliGRAM(s) Oral every 6 hours PRN psychosis  haloperidol    Injectable 2 milliGRAM(s) IntraMuscular once PRN agitation  LORazepam     Tablet 2 milliGRAM(s) Oral every 6 hours PRN agitation  LORazepam   Injectable 2 milliGRAM(s) IntraMuscular once PRN agitation  melatonin. 3 milliGRAM(s) Oral at bedtime PRN Insomnia  ondansetron   Disintegrating Tablet 8 milliGRAM(s) Oral two times a day PRN Nausea and/or Vomiting  
MEDICATIONS  (STANDING):  LORazepam     Tablet 2 milliGRAM(s) Oral at bedtime  LORazepam     Tablet 1 milliGRAM(s) Oral <User Schedule>  risperiDONE   Tablet 2 milliGRAM(s) Oral at bedtime    MEDICATIONS  (PRN):  acetaminophen     Tablet .. 650 milliGRAM(s) Oral every 6 hours PRN Mild Pain (1 - 3), Moderate Pain (4 - 6), Severe Pain (7 - 10)  diphenhydrAMINE 50 milliGRAM(s) Oral every 6 hours PRN agitation  diphenhydrAMINE Injectable 50 milliGRAM(s) IntraMuscular once PRN agtiation  haloperidol     Tablet 2 milliGRAM(s) Oral every 6 hours PRN psychosis  haloperidol    Injectable 2 milliGRAM(s) IntraMuscular once PRN agitation  LORazepam     Tablet 2 milliGRAM(s) Oral every 6 hours PRN agitation  LORazepam   Injectable 2 milliGRAM(s) IntraMuscular once PRN agitation  melatonin. 3 milliGRAM(s) Oral at bedtime PRN Insomnia  ondansetron   Disintegrating Tablet 8 milliGRAM(s) Oral two times a day PRN Nausea and/or Vomiting  
MEDICATIONS  (STANDING):  LORazepam     Tablet 1 milliGRAM(s) Oral at bedtime  risperiDONE   Tablet 3 milliGRAM(s) Oral at bedtime    MEDICATIONS  (PRN):  acetaminophen     Tablet .. 650 milliGRAM(s) Oral every 6 hours PRN Mild Pain (1 - 3), Moderate Pain (4 - 6), Severe Pain (7 - 10)  diphenhydrAMINE 50 milliGRAM(s) Oral every 6 hours PRN agitation  diphenhydrAMINE Injectable 50 milliGRAM(s) IntraMuscular once PRN agtiation  haloperidol     Tablet 2 milliGRAM(s) Oral every 6 hours PRN psychosis  haloperidol    Injectable 2 milliGRAM(s) IntraMuscular once PRN agitation  hydrOXYzine hydrochloride 50 milliGRAM(s) Oral every 6 hours PRN anxiety  LORazepam     Tablet 2 milliGRAM(s) Oral every 6 hours PRN agitation  LORazepam   Injectable 2 milliGRAM(s) IntraMuscular once PRN agitation  melatonin. 3 milliGRAM(s) Oral at bedtime PRN Insomnia  ondansetron   Disintegrating Tablet 8 milliGRAM(s) Oral two times a day PRN Nausea and/or Vomiting  
MEDICATIONS  (STANDING):  LORazepam     Tablet 1 milliGRAM(s) Oral at bedtime  risperiDONE   Tablet 3 milliGRAM(s) Oral at bedtime    MEDICATIONS  (PRN):  acetaminophen     Tablet .. 650 milliGRAM(s) Oral every 6 hours PRN Mild Pain (1 - 3), Moderate Pain (4 - 6), Severe Pain (7 - 10)  diphenhydrAMINE 50 milliGRAM(s) Oral every 6 hours PRN agitation  diphenhydrAMINE Injectable 50 milliGRAM(s) IntraMuscular once PRN agtiation  haloperidol     Tablet 2 milliGRAM(s) Oral every 6 hours PRN psychosis  haloperidol    Injectable 2 milliGRAM(s) IntraMuscular once PRN agitation  hydrOXYzine hydrochloride 50 milliGRAM(s) Oral every 6 hours PRN anxiety  LORazepam     Tablet 2 milliGRAM(s) Oral every 6 hours PRN agitation  LORazepam   Injectable 2 milliGRAM(s) IntraMuscular once PRN agitation  melatonin. 3 milliGRAM(s) Oral at bedtime PRN Insomnia  ondansetron   Disintegrating Tablet 8 milliGRAM(s) Oral two times a day PRN Nausea and/or Vomiting  
MEDICATIONS  (STANDING):  LORazepam     Tablet 2 milliGRAM(s) Oral three times a day  risperiDONE   Tablet 2 milliGRAM(s) Oral at bedtime    MEDICATIONS  (PRN):  acetaminophen     Tablet .. 650 milliGRAM(s) Oral every 6 hours PRN Mild Pain (1 - 3), Moderate Pain (4 - 6), Severe Pain (7 - 10)  diphenhydrAMINE 50 milliGRAM(s) Oral every 6 hours PRN agitation  diphenhydrAMINE Injectable 50 milliGRAM(s) IntraMuscular once PRN agtiation  haloperidol     Tablet 2 milliGRAM(s) Oral every 6 hours PRN psychosis  haloperidol    Injectable 2 milliGRAM(s) IntraMuscular once PRN agitation  LORazepam     Tablet 2 milliGRAM(s) Oral every 6 hours PRN agitation  LORazepam   Injectable 2 milliGRAM(s) IntraMuscular once PRN agitation  melatonin. 3 milliGRAM(s) Oral at bedtime PRN Insomnia  ondansetron   Disintegrating Tablet 8 milliGRAM(s) Oral two times a day PRN Nausea and/or Vomiting  
MEDICATIONS  (STANDING):  LORazepam     Tablet 1 milliGRAM(s) Oral at bedtime  LORazepam     Tablet 0.5 milliGRAM(s) Oral daily  risperiDONE   Tablet 4 milliGRAM(s) Oral at bedtime    MEDICATIONS  (PRN):  acetaminophen     Tablet .. 650 milliGRAM(s) Oral every 6 hours PRN Mild Pain (1 - 3), Moderate Pain (4 - 6), Severe Pain (7 - 10)  diphenhydrAMINE 50 milliGRAM(s) Oral every 6 hours PRN agitation  diphenhydrAMINE Injectable 50 milliGRAM(s) IntraMuscular once PRN agtiation  haloperidol     Tablet 2 milliGRAM(s) Oral every 6 hours PRN psychosis  haloperidol    Injectable 2 milliGRAM(s) IntraMuscular once PRN agitation  hydrOXYzine hydrochloride 50 milliGRAM(s) Oral every 6 hours PRN anxiety  LORazepam     Tablet 2 milliGRAM(s) Oral every 6 hours PRN agitation  LORazepam   Injectable 2 milliGRAM(s) IntraMuscular once PRN agitation  melatonin. 3 milliGRAM(s) Oral at bedtime PRN Insomnia  ondansetron   Disintegrating Tablet 8 milliGRAM(s) Oral two times a day PRN Nausea and/or Vomiting  
MEDICATIONS  (STANDING):  LORazepam     Tablet 0.5 milliGRAM(s) Oral daily  LORazepam     Tablet 1 milliGRAM(s) Oral at bedtime  risperiDONE   Tablet 3 milliGRAM(s) Oral at bedtime    MEDICATIONS  (PRN):  acetaminophen     Tablet .. 650 milliGRAM(s) Oral every 6 hours PRN Mild Pain (1 - 3), Moderate Pain (4 - 6), Severe Pain (7 - 10)  diphenhydrAMINE 50 milliGRAM(s) Oral every 6 hours PRN agitation  diphenhydrAMINE Injectable 50 milliGRAM(s) IntraMuscular once PRN agtiation  haloperidol     Tablet 2 milliGRAM(s) Oral every 6 hours PRN psychosis  haloperidol    Injectable 2 milliGRAM(s) IntraMuscular once PRN agitation  hydrOXYzine hydrochloride 50 milliGRAM(s) Oral every 6 hours PRN anxiety  LORazepam     Tablet 2 milliGRAM(s) Oral every 6 hours PRN agitation  LORazepam   Injectable 2 milliGRAM(s) IntraMuscular once PRN agitation  melatonin. 3 milliGRAM(s) Oral at bedtime PRN Insomnia  ondansetron   Disintegrating Tablet 8 milliGRAM(s) Oral two times a day PRN Nausea and/or Vomiting  
MEDICATIONS  (STANDING):  LORazepam     Tablet 1 milliGRAM(s) Oral at bedtime  risperiDONE   Tablet 3 milliGRAM(s) Oral at bedtime    MEDICATIONS  (PRN):  acetaminophen     Tablet .. 650 milliGRAM(s) Oral every 6 hours PRN Mild Pain (1 - 3), Moderate Pain (4 - 6), Severe Pain (7 - 10)  diphenhydrAMINE 50 milliGRAM(s) Oral every 6 hours PRN agitation  diphenhydrAMINE Injectable 50 milliGRAM(s) IntraMuscular once PRN agtiation  haloperidol     Tablet 2 milliGRAM(s) Oral every 6 hours PRN psychosis  haloperidol    Injectable 2 milliGRAM(s) IntraMuscular once PRN agitation  hydrOXYzine hydrochloride 50 milliGRAM(s) Oral every 6 hours PRN anxiety  LORazepam     Tablet 2 milliGRAM(s) Oral every 6 hours PRN agitation  LORazepam   Injectable 2 milliGRAM(s) IntraMuscular once PRN agitation  melatonin. 3 milliGRAM(s) Oral at bedtime PRN Insomnia  ondansetron   Disintegrating Tablet 8 milliGRAM(s) Oral two times a day PRN Nausea and/or Vomiting  
MEDICATIONS  (STANDING):  LORazepam     Tablet 1 milliGRAM(s) Oral two times a day  risperiDONE   Tablet 3 milliGRAM(s) Oral at bedtime    MEDICATIONS  (PRN):  acetaminophen     Tablet .. 650 milliGRAM(s) Oral every 6 hours PRN Mild Pain (1 - 3), Moderate Pain (4 - 6), Severe Pain (7 - 10)  diphenhydrAMINE 50 milliGRAM(s) Oral every 6 hours PRN agitation  diphenhydrAMINE Injectable 50 milliGRAM(s) IntraMuscular once PRN agtiation  haloperidol     Tablet 2 milliGRAM(s) Oral every 6 hours PRN psychosis  haloperidol    Injectable 2 milliGRAM(s) IntraMuscular once PRN agitation  LORazepam     Tablet 2 milliGRAM(s) Oral every 6 hours PRN agitation  LORazepam   Injectable 2 milliGRAM(s) IntraMuscular once PRN agitation  melatonin. 3 milliGRAM(s) Oral at bedtime PRN Insomnia  ondansetron   Disintegrating Tablet 8 milliGRAM(s) Oral two times a day PRN Nausea and/or Vomiting

## 2023-04-07 NOTE — BH INPATIENT PSYCHIATRY PROGRESS NOTE - NSBHMETABOLIC_PSY_ALL_CORE_FT
BMI:   HbA1c: A1C with Estimated Average Glucose Result: 4.7 % (03-13-23 @ 10:00)    Glucose:   BP: 113/72 (03-20-23 @ 07:52) (113/72 - 134/77)  Lipid Panel: Date/Time: 03-13-23 @ 10:00  Cholesterol, Serum: 152  Direct LDL: --  HDL Cholesterol, Serum: 28  Total Cholesterol/HDL Ration Measurement: --  Triglycerides, Serum: 64  
BMI:   HbA1c: A1C with Estimated Average Glucose Result: 4.7 % (03-13-23 @ 10:00)    Glucose:   BP: 124/70 (03-17-23 @ 06:37) (117/78 - 124/70)  Lipid Panel: Date/Time: 03-13-23 @ 10:00  Cholesterol, Serum: 152  Direct LDL: --  HDL Cholesterol, Serum: 28  Total Cholesterol/HDL Ration Measurement: --  Triglycerides, Serum: 64  
BMI:   HbA1c: A1C with Estimated Average Glucose Result: 4.7 % (03-13-23 @ 10:00)    Glucose: POCT Blood Glucose.: 95 mg/dL (03-21-23 @ 21:12)    BP: 101/73 (04-04-23 @ 07:51) (101/73 - 130/79)  Lipid Panel: Date/Time: 03-13-23 @ 10:00  Cholesterol, Serum: 152  Direct LDL: --  HDL Cholesterol, Serum: 28  Total Cholesterol/HDL Ration Measurement: --  Triglycerides, Serum: 64  
BMI:   HbA1c: A1C with Estimated Average Glucose Result: 4.7 % (03-13-23 @ 10:00)    Glucose: POCT Blood Glucose.: 95 mg/dL (03-21-23 @ 21:12)    BP: 129/76 (04-07-23 @ 07:38) (113/63 - 129/76)  Lipid Panel: Date/Time: 03-13-23 @ 10:00  Cholesterol, Serum: 152  Direct LDL: --  HDL Cholesterol, Serum: 28  Total Cholesterol/HDL Ration Measurement: --  Triglycerides, Serum: 64  
BMI:   HbA1c: A1C with Estimated Average Glucose Result: 4.7 % (03-13-23 @ 10:00)    Glucose: POCT Blood Glucose.: 95 mg/dL (03-21-23 @ 21:12)    BP: 122/68 (04-06-23 @ 07:46) (101/73 - 122/68)  Lipid Panel: Date/Time: 03-13-23 @ 10:00  Cholesterol, Serum: 152  Direct LDL: --  HDL Cholesterol, Serum: 28  Total Cholesterol/HDL Ration Measurement: --  Triglycerides, Serum: 64  
BMI:   HbA1c: A1C with Estimated Average Glucose Result: 4.7 % (03-13-23 @ 10:00)    Glucose: POCT Blood Glucose.: 95 mg/dL (03-21-23 @ 21:12)    BP: 130/79 (04-02-23 @ 20:30) (125/72 - 130/79)  Lipid Panel: Date/Time: 03-13-23 @ 10:00  Cholesterol, Serum: 152  Direct LDL: --  HDL Cholesterol, Serum: 28  Total Cholesterol/HDL Ration Measurement: --  Triglycerides, Serum: 64  
BMI:   HbA1c: A1C with Estimated Average Glucose Result: 4.7 % (03-13-23 @ 10:00)    Glucose: POCT Blood Glucose.: 95 mg/dL (03-21-23 @ 21:12)    BP: 104/69 (03-30-23 @ 07:55) (104/69 - 108/62)  Lipid Panel: Date/Time: 03-13-23 @ 10:00  Cholesterol, Serum: 152  Direct LDL: --  HDL Cholesterol, Serum: 28  Total Cholesterol/HDL Ration Measurement: --  Triglycerides, Serum: 64  
BMI:   HbA1c: A1C with Estimated Average Glucose Result: 4.7 % (03-13-23 @ 10:00)    Glucose: POCT Blood Glucose.: 95 mg/dL (03-21-23 @ 21:12)    BP: 130/73 (03-22-23 @ 07:47) (113/72 - 130/73)  Lipid Panel: Date/Time: 03-13-23 @ 10:00  Cholesterol, Serum: 152  Direct LDL: --  HDL Cholesterol, Serum: 28  Total Cholesterol/HDL Ration Measurement: --  Triglycerides, Serum: 64  
BMI:   HbA1c: A1C with Estimated Average Glucose Result: 4.7 % (03-13-23 @ 10:00)    Glucose: POCT Blood Glucose.: 95 mg/dL (03-21-23 @ 21:12)    BP: 106/62 (03-31-23 @ 08:31) (104/69 - 108/62)  Lipid Panel: Date/Time: 03-13-23 @ 10:00  Cholesterol, Serum: 152  Direct LDL: --  HDL Cholesterol, Serum: 28  Total Cholesterol/HDL Ration Measurement: --  Triglycerides, Serum: 64  
BMI:   HbA1c: A1C with Estimated Average Glucose Result: 4.7 % (03-13-23 @ 10:00)    Glucose:   BP: 117/78 (03-16-23 @ 07:47) (117/78 - 134/86)  Lipid Panel: Date/Time: 03-13-23 @ 10:00  Cholesterol, Serum: 152  Direct LDL: --  HDL Cholesterol, Serum: 28  Total Cholesterol/HDL Ration Measurement: --  Triglycerides, Serum: 64  
BMI:   HbA1c: A1C with Estimated Average Glucose Result: 4.7 % (03-13-23 @ 10:00)    Glucose: POCT Blood Glucose.: 95 mg/dL (03-21-23 @ 21:12)    BP: 119/72 (03-23-23 @ 07:35) (119/72 - 130/73)  Lipid Panel: Date/Time: 03-13-23 @ 10:00  Cholesterol, Serum: 152  Direct LDL: --  HDL Cholesterol, Serum: 28  Total Cholesterol/HDL Ration Measurement: --  Triglycerides, Serum: 64  
BMI:   HbA1c: A1C with Estimated Average Glucose Result: 4.7 % (03-13-23 @ 10:00)    Glucose: POCT Blood Glucose.: 95 mg/dL (03-21-23 @ 21:12)    BP: 121/72 (03-27-23 @ 09:04) (96/68 - 123/90)  Lipid Panel: Date/Time: 03-13-23 @ 10:00  Cholesterol, Serum: 152  Direct LDL: --  HDL Cholesterol, Serum: 28  Total Cholesterol/HDL Ration Measurement: --  Triglycerides, Serum: 64  
BMI:   HbA1c: A1C with Estimated Average Glucose Result: 4.7 % (03-13-23 @ 10:00)    Glucose:   BP: 122/81 (03-15-23 @ 06:24) (122/81 - 134/86)  Lipid Panel: Date/Time: 03-13-23 @ 10:00  Cholesterol, Serum: 152  Direct LDL: --  HDL Cholesterol, Serum: 28  Total Cholesterol/HDL Ration Measurement: --  Triglycerides, Serum: 64  
BMI:   HbA1c: A1C with Estimated Average Glucose Result: 4.7 % (03-13-23 @ 10:00)    Glucose: POCT Blood Glucose.: 95 mg/dL (03-21-23 @ 21:12)    BP: 113/63 (04-05-23 @ 07:53) (101/73 - 130/79)  Lipid Panel: Date/Time: 03-13-23 @ 10:00  Cholesterol, Serum: 152  Direct LDL: --  HDL Cholesterol, Serum: 28  Total Cholesterol/HDL Ration Measurement: --  Triglycerides, Serum: 64  
BMI:   HbA1c: A1C with Estimated Average Glucose Result: 4.7 % (03-13-23 @ 10:00)    Glucose:   BP: 134/86 (03-13-23 @ 21:51) (127/86 - 134/86)  Lipid Panel: Date/Time: 03-13-23 @ 10:00  Cholesterol, Serum: 152  Direct LDL: --  HDL Cholesterol, Serum: 28  Total Cholesterol/HDL Ration Measurement: --  Triglycerides, Serum: 64  
BMI:   HbA1c: A1C with Estimated Average Glucose Result: 4.7 % (03-13-23 @ 10:00)    Glucose: POCT Blood Glucose.: 95 mg/dL (03-21-23 @ 21:12)    BP: 119/72 (03-23-23 @ 07:35) (119/72 - 130/73)  Lipid Panel: Date/Time: 03-13-23 @ 10:00  Cholesterol, Serum: 152  Direct LDL: --  HDL Cholesterol, Serum: 28  Total Cholesterol/HDL Ration Measurement: --  Triglycerides, Serum: 64  
BMI:   HbA1c: A1C with Estimated Average Glucose Result: 4.7 % (03-13-23 @ 10:00)    Glucose:   BP: 123/76 (03-21-23 @ 08:17) (113/72 - 123/76)  Lipid Panel: Date/Time: 03-13-23 @ 10:00  Cholesterol, Serum: 152  Direct LDL: --  HDL Cholesterol, Serum: 28  Total Cholesterol/HDL Ration Measurement: --  Triglycerides, Serum: 64  
BMI:   HbA1c: A1C with Estimated Average Glucose Result: 4.7 % (03-13-23 @ 10:00)    Glucose: POCT Blood Glucose.: 95 mg/dL (03-21-23 @ 21:12)    BP: 108/62 (03-29-23 @ 08:57) (108/62 - 121/72)  Lipid Panel: Date/Time: 03-13-23 @ 10:00  Cholesterol, Serum: 152  Direct LDL: --  HDL Cholesterol, Serum: 28  Total Cholesterol/HDL Ration Measurement: --  Triglycerides, Serum: 64  
BMI:   HbA1c: A1C with Estimated Average Glucose Result: 4.7 % (03-13-23 @ 10:00)    Glucose: POCT Blood Glucose.: 95 mg/dL (03-21-23 @ 21:12)    BP: 121/72 (03-27-23 @ 09:04) (96/68 - 121/72)  Lipid Panel: Date/Time: 03-13-23 @ 10:00  Cholesterol, Serum: 152  Direct LDL: --  HDL Cholesterol, Serum: 28  Total Cholesterol/HDL Ration Measurement: --  Triglycerides, Serum: 64

## 2023-04-07 NOTE — BH INPATIENT PSYCHIATRY PROGRESS NOTE - NSBHCONTPROVIDER_PSY_ALL_CORE
Not applicable

## 2023-04-07 NOTE — BH INPATIENT PSYCHIATRY PROGRESS NOTE - NSTXDISORGPROGRES_PSY_ALL_CORE
Improving
No Change
Improving
No Change
Met - goal discontinued
Improving

## 2023-04-07 NOTE — BH INPATIENT PSYCHIATRY PROGRESS NOTE - NSTXDISORGDATEEST_PSY_ALL_CORE
13-Mar-2023
12-Mar-2023
13-Mar-2023
13-Mar-2023
12-Mar-2023
13-Mar-2023

## 2023-04-07 NOTE — BH INPATIENT PSYCHIATRY PROGRESS NOTE - PRN MEDS
MEDICATIONS  (PRN):  acetaminophen     Tablet .. 650 milliGRAM(s) Oral every 6 hours PRN Mild Pain (1 - 3), Moderate Pain (4 - 6), Severe Pain (7 - 10)  diphenhydrAMINE 50 milliGRAM(s) Oral every 6 hours PRN agitation  diphenhydrAMINE Injectable 50 milliGRAM(s) IntraMuscular once PRN agtiation  haloperidol     Tablet 2 milliGRAM(s) Oral every 6 hours PRN psychosis  haloperidol    Injectable 2 milliGRAM(s) IntraMuscular once PRN agitation  LORazepam     Tablet 2 milliGRAM(s) Oral every 6 hours PRN agitation  LORazepam   Injectable 2 milliGRAM(s) IntraMuscular once PRN agitation  melatonin. 3 milliGRAM(s) Oral at bedtime PRN Insomnia  ondansetron   Disintegrating Tablet 8 milliGRAM(s) Oral two times a day PRN Nausea and/or Vomiting  
MEDICATIONS  (PRN):  acetaminophen     Tablet .. 650 milliGRAM(s) Oral every 6 hours PRN Mild Pain (1 - 3), Moderate Pain (4 - 6), Severe Pain (7 - 10)  diphenhydrAMINE 50 milliGRAM(s) Oral every 6 hours PRN agitation  diphenhydrAMINE Injectable 50 milliGRAM(s) IntraMuscular once PRN agtiation  haloperidol     Tablet 2 milliGRAM(s) Oral every 6 hours PRN psychosis  haloperidol    Injectable 2 milliGRAM(s) IntraMuscular once PRN agitation  hydrOXYzine hydrochloride 50 milliGRAM(s) Oral every 6 hours PRN anxiety  LORazepam     Tablet 2 milliGRAM(s) Oral every 6 hours PRN agitation  LORazepam   Injectable 2 milliGRAM(s) IntraMuscular once PRN agitation  melatonin. 3 milliGRAM(s) Oral at bedtime PRN Insomnia  ondansetron   Disintegrating Tablet 8 milliGRAM(s) Oral two times a day PRN Nausea and/or Vomiting  
MEDICATIONS  (PRN):  acetaminophen     Tablet .. 650 milliGRAM(s) Oral every 6 hours PRN Mild Pain (1 - 3), Moderate Pain (4 - 6), Severe Pain (7 - 10)  diphenhydrAMINE 50 milliGRAM(s) Oral every 6 hours PRN agitation  diphenhydrAMINE Injectable 50 milliGRAM(s) IntraMuscular once PRN agtiation  haloperidol     Tablet 2 milliGRAM(s) Oral every 6 hours PRN psychosis  haloperidol    Injectable 2 milliGRAM(s) IntraMuscular once PRN agitation  hydrOXYzine hydrochloride 50 milliGRAM(s) Oral every 6 hours PRN anxiety  LORazepam     Tablet 2 milliGRAM(s) Oral every 6 hours PRN agitation  LORazepam   Injectable 2 milliGRAM(s) IntraMuscular once PRN agitation  melatonin. 3 milliGRAM(s) Oral at bedtime PRN Insomnia  ondansetron   Disintegrating Tablet 8 milliGRAM(s) Oral two times a day PRN Nausea and/or Vomiting  
MEDICATIONS  (PRN):  acetaminophen     Tablet .. 650 milliGRAM(s) Oral every 6 hours PRN Mild Pain (1 - 3), Moderate Pain (4 - 6), Severe Pain (7 - 10)  diphenhydrAMINE 50 milliGRAM(s) Oral every 6 hours PRN agitation  diphenhydrAMINE Injectable 50 milliGRAM(s) IntraMuscular once PRN agtiation  haloperidol     Tablet 2 milliGRAM(s) Oral every 6 hours PRN psychosis  haloperidol    Injectable 2 milliGRAM(s) IntraMuscular once PRN agitation  LORazepam     Tablet 2 milliGRAM(s) Oral every 6 hours PRN agitation  LORazepam   Injectable 2 milliGRAM(s) IntraMuscular once PRN agitation  melatonin. 3 milliGRAM(s) Oral at bedtime PRN Insomnia  ondansetron   Disintegrating Tablet 8 milliGRAM(s) Oral two times a day PRN Nausea and/or Vomiting  
MEDICATIONS  (PRN):  acetaminophen     Tablet .. 650 milliGRAM(s) Oral every 6 hours PRN Mild Pain (1 - 3), Moderate Pain (4 - 6), Severe Pain (7 - 10)  diphenhydrAMINE 50 milliGRAM(s) Oral every 6 hours PRN agitation  diphenhydrAMINE Injectable 50 milliGRAM(s) IntraMuscular once PRN agtiation  haloperidol     Tablet 2 milliGRAM(s) Oral every 6 hours PRN psychosis  haloperidol    Injectable 2 milliGRAM(s) IntraMuscular once PRN agitation  hydrOXYzine hydrochloride 50 milliGRAM(s) Oral every 6 hours PRN anxiety  LORazepam     Tablet 2 milliGRAM(s) Oral every 6 hours PRN agitation  LORazepam   Injectable 2 milliGRAM(s) IntraMuscular once PRN agitation  melatonin. 3 milliGRAM(s) Oral at bedtime PRN Insomnia  ondansetron   Disintegrating Tablet 8 milliGRAM(s) Oral two times a day PRN Nausea and/or Vomiting  
MEDICATIONS  (PRN):  acetaminophen     Tablet .. 650 milliGRAM(s) Oral every 6 hours PRN Mild Pain (1 - 3), Moderate Pain (4 - 6), Severe Pain (7 - 10)  diphenhydrAMINE 50 milliGRAM(s) Oral every 6 hours PRN agitation  diphenhydrAMINE Injectable 50 milliGRAM(s) IntraMuscular once PRN agtiation  haloperidol     Tablet 2 milliGRAM(s) Oral every 6 hours PRN psychosis  haloperidol    Injectable 2 milliGRAM(s) IntraMuscular once PRN agitation  LORazepam     Tablet 2 milliGRAM(s) Oral every 6 hours PRN agitation  LORazepam   Injectable 2 milliGRAM(s) IntraMuscular once PRN agitation  melatonin. 3 milliGRAM(s) Oral at bedtime PRN Insomnia  ondansetron   Disintegrating Tablet 8 milliGRAM(s) Oral two times a day PRN Nausea and/or Vomiting  
MEDICATIONS  (PRN):  acetaminophen     Tablet .. 650 milliGRAM(s) Oral every 6 hours PRN Mild Pain (1 - 3), Moderate Pain (4 - 6), Severe Pain (7 - 10)  diphenhydrAMINE 50 milliGRAM(s) Oral every 6 hours PRN agitation  diphenhydrAMINE Injectable 50 milliGRAM(s) IntraMuscular once PRN agtiation  haloperidol     Tablet 2 milliGRAM(s) Oral every 6 hours PRN psychosis  haloperidol    Injectable 2 milliGRAM(s) IntraMuscular once PRN agitation  hydrOXYzine hydrochloride 50 milliGRAM(s) Oral every 6 hours PRN anxiety  LORazepam     Tablet 2 milliGRAM(s) Oral every 6 hours PRN agitation  LORazepam   Injectable 2 milliGRAM(s) IntraMuscular once PRN agitation  melatonin. 3 milliGRAM(s) Oral at bedtime PRN Insomnia  ondansetron   Disintegrating Tablet 8 milliGRAM(s) Oral two times a day PRN Nausea and/or Vomiting  
MEDICATIONS  (PRN):  acetaminophen     Tablet .. 650 milliGRAM(s) Oral every 6 hours PRN Mild Pain (1 - 3), Moderate Pain (4 - 6), Severe Pain (7 - 10)  diphenhydrAMINE 50 milliGRAM(s) Oral every 6 hours PRN agitation  diphenhydrAMINE Injectable 50 milliGRAM(s) IntraMuscular once PRN agtiation  haloperidol     Tablet 2 milliGRAM(s) Oral every 6 hours PRN psychosis  haloperidol    Injectable 2 milliGRAM(s) IntraMuscular once PRN agitation  LORazepam     Tablet 2 milliGRAM(s) Oral every 6 hours PRN agitation  LORazepam   Injectable 2 milliGRAM(s) IntraMuscular once PRN agitation  melatonin. 3 milliGRAM(s) Oral at bedtime PRN Insomnia  ondansetron   Disintegrating Tablet 8 milliGRAM(s) Oral two times a day PRN Nausea and/or Vomiting  
MEDICATIONS  (PRN):  acetaminophen     Tablet .. 650 milliGRAM(s) Oral every 6 hours PRN Mild Pain (1 - 3), Moderate Pain (4 - 6), Severe Pain (7 - 10)  diphenhydrAMINE 50 milliGRAM(s) Oral every 6 hours PRN agitation  diphenhydrAMINE Injectable 50 milliGRAM(s) IntraMuscular once PRN agtiation  haloperidol     Tablet 2 milliGRAM(s) Oral every 6 hours PRN psychosis  haloperidol    Injectable 2 milliGRAM(s) IntraMuscular once PRN agitation  hydrOXYzine hydrochloride 50 milliGRAM(s) Oral every 6 hours PRN anxiety  LORazepam     Tablet 2 milliGRAM(s) Oral every 6 hours PRN agitation  LORazepam   Injectable 2 milliGRAM(s) IntraMuscular once PRN agitation  melatonin. 3 milliGRAM(s) Oral at bedtime PRN Insomnia  ondansetron   Disintegrating Tablet 8 milliGRAM(s) Oral two times a day PRN Nausea and/or Vomiting  
MEDICATIONS  (PRN):  acetaminophen     Tablet .. 650 milliGRAM(s) Oral every 6 hours PRN Mild Pain (1 - 3), Moderate Pain (4 - 6), Severe Pain (7 - 10)  diphenhydrAMINE 50 milliGRAM(s) Oral every 6 hours PRN agitation  diphenhydrAMINE Injectable 50 milliGRAM(s) IntraMuscular once PRN agtiation  haloperidol     Tablet 2 milliGRAM(s) Oral every 6 hours PRN psychosis  haloperidol    Injectable 2 milliGRAM(s) IntraMuscular once PRN agitation  LORazepam     Tablet 2 milliGRAM(s) Oral every 6 hours PRN agitation  LORazepam   Injectable 2 milliGRAM(s) IntraMuscular once PRN agitation  melatonin. 3 milliGRAM(s) Oral at bedtime PRN Insomnia  ondansetron   Disintegrating Tablet 8 milliGRAM(s) Oral two times a day PRN Nausea and/or Vomiting  
MEDICATIONS  (PRN):  acetaminophen     Tablet .. 650 milliGRAM(s) Oral every 6 hours PRN Mild Pain (1 - 3), Moderate Pain (4 - 6), Severe Pain (7 - 10)  diphenhydrAMINE 50 milliGRAM(s) Oral every 6 hours PRN agitation  diphenhydrAMINE Injectable 50 milliGRAM(s) IntraMuscular once PRN agtiation  haloperidol     Tablet 2 milliGRAM(s) Oral every 6 hours PRN psychosis  haloperidol    Injectable 2 milliGRAM(s) IntraMuscular once PRN agitation  hydrOXYzine hydrochloride 50 milliGRAM(s) Oral every 6 hours PRN anxiety  LORazepam     Tablet 2 milliGRAM(s) Oral every 6 hours PRN agitation  LORazepam   Injectable 2 milliGRAM(s) IntraMuscular once PRN agitation  melatonin. 3 milliGRAM(s) Oral at bedtime PRN Insomnia  ondansetron   Disintegrating Tablet 8 milliGRAM(s) Oral two times a day PRN Nausea and/or Vomiting

## 2023-04-07 NOTE — BH INPATIENT PSYCHIATRY PROGRESS NOTE - NSBHMSETHTASSOC_PSY_A_CORE
Normal
Normal
Loose
Normal
Normal
Loose
Normal
Loose
Normal
Loose
Normal

## 2023-04-07 NOTE — PHARMACOTHERAPY INTERVENTION NOTE - COMMENTS
Discharge Medication Education Note    Provided medication education upon discharge. Patient to be discharged on the following medications:     - Risperidone 4 mG PO qHS  - Lorazepam 0.5 mG PO qAM and 1 mG PO qHS  - Melatonin 3 mG PO qHS PRN insomnia    Discussed indication, dosing, and potential side effects. Patient provided the opportunity to ask questions. Patient expressed understanding to the above instruction. Patient provided supply of the above medications upon discharge.    Thank you for the opportunity to participate in the care of this patient.     Courtney Michelle, PharmD, BCPP

## 2023-04-07 NOTE — BH INPATIENT PSYCHIATRY PROGRESS NOTE - NSDCCRITERIA_PSY_ALL_CORE
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

## 2023-04-07 NOTE — BH INPATIENT PSYCHIATRY PROGRESS NOTE - NSBHMSESPEECH_PSY_A_CORE
Normal volume, rate, productivity, spontaneity and articulation
Abnormal as indicated, otherwise normal...
Normal volume, rate, productivity, spontaneity and articulation
Abnormal as indicated, otherwise normal...
Abnormal as indicated, otherwise normal...
Normal volume, rate, productivity, spontaneity and articulation
Abnormal as indicated, otherwise normal...
Normal volume, rate, productivity, spontaneity and articulation
Abnormal as indicated, otherwise normal...

## 2023-04-07 NOTE — BH INPATIENT PSYCHIATRY PROGRESS NOTE - LEVEL OF CONSCIOUSNESS
Lethargic, arousable to verbal stimulus
Alert

## 2023-04-07 NOTE — BH INPATIENT PSYCHIATRY PROGRESS NOTE - NSTXDISORGDATETRGT_PSY_ALL_CORE
27-Mar-2023
03-Apr-2023
27-Mar-2023
27-Mar-2023
03-Apr-2023
20-Mar-2023
10-Apr-2023
03-Apr-2023
03-Apr-2023
22-Mar-2023
27-Mar-2023
10-Apr-2023
27-Mar-2023
20-Mar-2023
10-Apr-2023
03-Apr-2023
03-Apr-2023
07-Apr-2023
22-Mar-2023

## 2023-04-07 NOTE — BH INPATIENT PSYCHIATRY PROGRESS NOTE - NSTXDCOPLKDATETRGT_PSY_ALL_CORE
20-Mar-2023
27-Mar-2023
10-Apr-2023
10-Apr-2023
20-Mar-2023
03-Apr-2023
20-Mar-2023
03-Apr-2023
03-Apr-2023
27-Mar-2023
03-Apr-2023
10-Apr-2023
27-Mar-2023
10-Apr-2023
27-Mar-2023
20-Mar-2023
03-Apr-2023

## 2023-04-07 NOTE — BH INPATIENT PSYCHIATRY PROGRESS NOTE - NSTXDCOPLKDATENEW_PSY_ALL_CORE
20-Mar-2023
27-Mar-2023
20-Mar-2023
27-Mar-2023
20-Mar-2023
27-Mar-2023
27-Mar-2023
20-Mar-2023
27-Mar-2023

## 2023-04-07 NOTE — BH INPATIENT PSYCHIATRY PROGRESS NOTE - NSBHMSESPABN_PSY_A_CORE
Soft volume/Slowed rate/Decreased productivity
Soft volume/Decreased productivity/Increased latency
Soft volume
Soft volume/Decreased productivity/Increased latency
Soft volume
Soft volume/Decreased productivity/Increased latency
Pressured rate
Pressured rate
Soft volume/Slowed rate
Soft volume
Soft volume
Soft volume/Decreased productivity/Increased latency

## 2023-04-07 NOTE — BH INPATIENT PSYCHIATRY PROGRESS NOTE - NSTXPSYCHOINTERMD_PSY_ALL_CORE
Psychopharmacology  Psychoeducation

## 2023-04-07 NOTE — BH INPATIENT PSYCHIATRY PROGRESS NOTE - NSBHCHARTREVIEWVS_PSY_A_CORE FT
Vital Signs Last 24 Hrs  T(C): 36.1 (03-17-23 @ 06:37), Max: 36.1 (03-16-23 @ 20:57)  T(F): 97 (03-17-23 @ 06:37), Max: 97 (03-16-23 @ 20:57)  HR: 87 (03-17-23 @ 06:37) (87 - 87)  BP: 124/70 (03-17-23 @ 06:37) (124/70 - 124/70)  BP(mean): --  RR: 18 (03-17-23 @ 06:37) (18 - 18)  SpO2: --    
Vital Signs Last 24 Hrs  T(C): 36.9 (04-04-23 @ 07:51), Max: 36.9 (04-04-23 @ 07:51)  T(F): 98.4 (04-04-23 @ 07:51), Max: 98.4 (04-04-23 @ 07:51)  HR: 113 (04-04-23 @ 07:51) (113 - 113)  BP: 101/73 (04-04-23 @ 07:51) (101/73 - 101/73)  BP(mean): --  RR: 16 (04-04-23 @ 07:51) (16 - 16)  SpO2: --    
Vital Signs Last 24 Hrs  T(C): 36.6 (03-29-23 @ 08:57), Max: 36.6 (03-29-23 @ 08:57)  T(F): 97.8 (03-29-23 @ 08:57), Max: 97.8 (03-29-23 @ 08:57)  HR: 91 (03-29-23 @ 08:57) (91 - 91)  BP: 108/62 (03-29-23 @ 08:57) (108/62 - 108/62)  BP(mean): --  RR: 19 (03-29-23 @ 08:57) (19 - 19)  SpO2: --    Orthostatic VS  03-28-23 @ 08:00  Lying BP: --/-- HR: --  Sitting BP: 102/65 HR: 102  Standing BP: --/-- HR: --  Site: --  Mode: --  
Vital Signs Last 24 Hrs  T(C): 36.9 (03-23-23 @ 07:35), Max: 36.9 (03-23-23 @ 07:35)  T(F): 98.5 (03-23-23 @ 07:35), Max: 98.5 (03-23-23 @ 07:35)  HR: 105 (03-23-23 @ 07:35) (105 - 105)  BP: 119/72 (03-23-23 @ 07:35) (119/72 - 119/72)  BP(mean): --  RR: 14 (03-23-23 @ 07:35) (14 - 14)  SpO2: --    
Vital Signs Last 24 Hrs  T(C): 36.9 (03-30-23 @ 07:55), Max: 36.9 (03-30-23 @ 07:55)  T(F): 98.4 (03-30-23 @ 07:55), Max: 98.4 (03-30-23 @ 07:55)  HR: 90 (03-30-23 @ 07:55) (90 - 90)  BP: 104/69 (03-30-23 @ 07:55) (104/69 - 104/69)  BP(mean): --  RR: 14 (03-30-23 @ 07:55) (14 - 14)  SpO2: --    
Vital Signs Last 24 Hrs  T(C): 36.9 (03-27-23 @ 09:04), Max: 37.6 (03-26-23 @ 20:26)  T(F): 98.4 (03-27-23 @ 09:04), Max: 99.7 (03-26-23 @ 20:26)  HR: 106 (03-27-23 @ 09:04) (106 - 106)  BP: 121/72 (03-27-23 @ 09:04) (121/72 - 121/72)  BP(mean): --  RR: 18 (03-27-23 @ 09:04) (18 - 18)  SpO2: --    
Vital Signs Last 24 Hrs  T(C): 36.9 (03-16-23 @ 07:47), Max: 36.9 (03-16-23 @ 07:47)  T(F): 98.4 (03-16-23 @ 07:47), Max: 98.4 (03-16-23 @ 07:47)  HR: 117 (03-16-23 @ 07:47) (117 - 117)  BP: 117/78 (03-16-23 @ 07:47) (117/78 - 117/78)  BP(mean): --  RR: 18 (03-16-23 @ 07:47) (18 - 18)  SpO2: --    
Vital Signs Last 24 Hrs  T(C): 36.1 (03-24-23 @ 06:36), Max: 36.6 (03-23-23 @ 20:25)  T(F): 97 (03-24-23 @ 06:36), Max: 97.9 (03-23-23 @ 20:25)  HR: --  BP: --  BP(mean): --  RR: 18 (03-24-23 @ 06:36) (18 - 18)  SpO2: --    Orthostatic VS  03-24-23 @ 06:36  Lying BP: --/-- HR: --  Sitting BP: 113/69 HR: 106  Standing BP: 105/64 HR: 100  Site: --  Mode: --  
Vital Signs Last 24 Hrs  T(C): 36.4 (03-20-23 @ 07:52), Max: 36.5 (03-19-23 @ 20:24)  T(F): 97.5 (03-20-23 @ 07:52), Max: 97.7 (03-19-23 @ 20:24)  HR: 103 (03-20-23 @ 07:52) (103 - 103)  BP: 113/72 (03-20-23 @ 07:52) (113/72 - 113/72)  BP(mean): --  RR: 16 (03-20-23 @ 07:52) (16 - 16)  SpO2: --    Orthostatic VS  03-19-23 @ 15:59  Lying BP: --/-- HR: --  Sitting BP: 120/74 HR: 118  Standing BP: --/-- HR: --  Site: --  Mode: --  Orthostatic VS  03-19-23 @ 06:22  Lying BP: --/-- HR: --  Sitting BP: 119/67 HR: 73  Standing BP: --/-- HR: --  Site: --  Mode: --  
Vital Signs Last 24 Hrs  T(C): 36.8 (04-05-23 @ 07:53), Max: 36.8 (04-05-23 @ 07:53)  T(F): 98.3 (04-05-23 @ 07:53), Max: 98.3 (04-05-23 @ 07:53)  HR: 113 (04-05-23 @ 07:53) (113 - 113)  BP: 113/63 (04-05-23 @ 07:53) (113/63 - 113/63)  BP(mean): --  RR: 17 (04-05-23 @ 07:53) (17 - 17)  SpO2: --    
Vital Signs Last 24 Hrs  T(C): 36.4 (03-14-23 @ 08:08), Max: 36.4 (03-13-23 @ 20:31)  T(F): 97.5 (03-14-23 @ 08:08), Max: 97.6 (03-13-23 @ 20:31)  HR: 110 (03-13-23 @ 21:51) (110 - 110)  BP: 134/86 (03-13-23 @ 21:51) (134/86 - 134/86)  BP(mean): --  RR: 20 (03-13-23 @ 21:51) (20 - 20)  SpO2: --    Orthostatic VS  03-14-23 @ 08:08  Lying BP: --/-- HR: --  Sitting BP: 133/66 HR: 118  Standing BP: --/-- HR: --  Site: --  Mode: --  Orthostatic VS  03-12-23 @ 19:18  Lying BP: --/-- HR: --  Sitting BP: 133/96 HR: 106  Standing BP: 133/81 HR: 124  Site: --  Mode: --  
Vital Signs Last 24 Hrs  T(C): 36.8 (04-07-23 @ 07:38), Max: 37.7 (04-06-23 @ 20:18)  T(F): 98.3 (04-07-23 @ 07:38), Max: 99.8 (04-06-23 @ 20:18)  HR: 109 (04-07-23 @ 07:38) (109 - 109)  BP: 129/76 (04-07-23 @ 07:38) (129/76 - 129/76)  BP(mean): --  RR: 18 (04-07-23 @ 07:38) (18 - 18)  SpO2: --    
Vital Signs Last 24 Hrs  T(C): 36.6 (04-06-23 @ 07:46), Max: 36.8 (04-05-23 @ 20:22)  T(F): 97.9 (04-06-23 @ 07:46), Max: 98.2 (04-05-23 @ 20:22)  HR: 120 (04-06-23 @ 07:46) (120 - 120)  BP: 122/68 (04-06-23 @ 07:46) (122/68 - 122/68)  BP(mean): --  RR: 16 (04-06-23 @ 07:46) (16 - 16)  SpO2: --    
Vital Signs Last 24 Hrs  T(C): 36.7 (04-02-23 @ 20:30), Max: 36.7 (04-02-23 @ 20:00)  T(F): 98.1 (04-02-23 @ 20:30), Max: 98.1 (04-02-23 @ 20:30)  HR: 115 (04-02-23 @ 20:30) (115 - 120)  BP: 130/79 (04-02-23 @ 20:30) (126/75 - 130/79)  BP(mean): --  RR: 16 (04-02-23 @ 20:30) (16 - 18)  SpO2: 99% (04-02-23 @ 20:30) (99% - 99%)    Orthostatic VS  04-02-23 @ 07:43  Lying BP: 114/80 HR: 97  Sitting BP: --/-- HR: --  Standing BP: --/-- HR: --  Site: --  Mode: --  
Vital Signs Last 24 Hrs  T(C): 36.3 (03-15-23 @ 06:24), Max: 36.3 (03-15-23 @ 06:24)  T(F): 97.4 (03-15-23 @ 06:24), Max: 97.4 (03-15-23 @ 06:24)  HR: 112 (03-15-23 @ 06:24) (112 - 112)  BP: 122/81 (03-15-23 @ 06:24) (122/81 - 122/81)  BP(mean): --  RR: --  SpO2: --    Orthostatic VS  03-14-23 @ 08:08  Lying BP: --/-- HR: --  Sitting BP: 133/66 HR: 118  Standing BP: --/-- HR: --  Site: --  Mode: --  
Vital Signs Last 24 Hrs  T(C): 36.3 (03-21-23 @ 08:17), Max: 37 (03-20-23 @ 20:42)  T(F): 97.4 (03-21-23 @ 08:17), Max: 98.6 (03-20-23 @ 20:42)  HR: 86 (03-21-23 @ 08:17) (86 - 86)  BP: 123/76 (03-21-23 @ 08:17) (123/76 - 123/76)  BP(mean): --  RR: 20 (03-21-23 @ 08:17) (20 - 20)  SpO2: --    Orthostatic VS  03-19-23 @ 15:59  Lying BP: --/-- HR: --  Sitting BP: 120/74 HR: 118  Standing BP: --/-- HR: --  Site: --  Mode: --  
Vital Signs Last 24 Hrs  T(C): 36.1 (03-22-23 @ 07:47), Max: 36.6 (03-21-23 @ 20:34)  T(F): 97 (03-22-23 @ 07:47), Max: 97.8 (03-21-23 @ 20:34)  HR: 116 (03-22-23 @ 07:47) (116 - 116)  BP: 130/73 (03-22-23 @ 07:47) (130/73 - 130/73)  BP(mean): --  RR: --  SpO2: --    
Vital Signs Last 24 Hrs  T(C): 36.1 (03-28-23 @ 08:00), Max: 36.5 (03-27-23 @ 20:13)  T(F): 96.9 (03-28-23 @ 08:00), Max: 97.7 (03-27-23 @ 20:13)  HR: --  BP: --  BP(mean): --  RR: 16 (03-28-23 @ 08:00) (16 - 16)  SpO2: --    Orthostatic VS  03-28-23 @ 08:00  Lying BP: --/-- HR: --  Sitting BP: 102/65 HR: 102  Standing BP: --/-- HR: --  Site: --  Mode: --  
Vital Signs Last 24 Hrs  T(C): 36.7 (03-31-23 @ 08:31), Max: 36.7 (03-31-23 @ 08:31)  T(F): 98 (03-31-23 @ 08:31), Max: 98 (03-31-23 @ 08:31)  HR: 97 (03-31-23 @ 08:31) (97 - 97)  BP: 106/62 (03-31-23 @ 08:31) (106/62 - 106/62)  BP(mean): --  RR: 18 (03-31-23 @ 08:31) (18 - 18)  SpO2: --

## 2023-04-07 NOTE — BH INPATIENT PSYCHIATRY PROGRESS NOTE - NSBHMSEMOOD_PSY_A_CORE
Depressed
Normal
Depressed
Normal
Depressed
Normal
Normal
Depressed
Normal
Depressed

## 2023-04-07 NOTE — BH INPATIENT PSYCHIATRY PROGRESS NOTE - NSCGISEVERILLNESS_PSY_ALL_CORE
4 = Moderately ill – overt symptoms causing noticeable, but modest, functional impairment or distress; symptom level may warrant medication
3 = Mildly ill – clearly established symptoms with minimal, if any, distress or difficulty in social and occupational function
5 = Markedly ill - intrusive symptoms that distinctly impair social/occupational function or cause intrusive levels of distress
3 = Mildly ill – clearly established symptoms with minimal, if any, distress or difficulty in social and occupational function
4 = Moderately ill – overt symptoms causing noticeable, but modest, functional impairment or distress; symptom level may warrant medication
3 = Mildly ill – clearly established symptoms with minimal, if any, distress or difficulty in social and occupational function
4 = Moderately ill – overt symptoms causing noticeable, but modest, functional impairment or distress; symptom level may warrant medication
5 = Markedly ill - intrusive symptoms that distinctly impair social/occupational function or cause intrusive levels of distress
3 = Mildly ill – clearly established symptoms with minimal, if any, distress or difficulty in social and occupational function
3 = Mildly ill – clearly established symptoms with minimal, if any, distress or difficulty in social and occupational function

## 2023-04-07 NOTE — BH INPATIENT PSYCHIATRY PROGRESS NOTE - NSBHMSETHTPROC_PSY_A_CORE
Tangential
Linear
Tangential
Linear
Tangential
Linear
Linear

## 2023-04-07 NOTE — BH INPATIENT PSYCHIATRY PROGRESS NOTE - NSTXPROBDISORG_PSY_ALL_CORE
DISORGANIZATION OF THOUGHT/BEHAVIOR

## 2023-04-07 NOTE — BH INPATIENT PSYCHIATRY PROGRESS NOTE - NSTXDCOPLKDATEEST_PSY_ALL_CORE
20-Mar-2023
03-Apr-2023
13-Mar-2023
20-Mar-2023
03-Apr-2023
27-Mar-2023
20-Mar-2023
27-Mar-2023
13-Mar-2023
20-Mar-2023
03-Apr-2023
20-Mar-2023
03-Apr-2023
20-Mar-2023
13-Mar-2023
13-Mar-2023
27-Mar-2023
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
## 2023-04-07 NOTE — BH INPATIENT PSYCHIATRY PROGRESS NOTE - NSBHATTESTTYPEVISIT_PSY_A_CORE
Attending Only

## 2023-04-07 NOTE — BH INPATIENT PSYCHIATRY PROGRESS NOTE - NSTXPSYCHOGOAL_PSY_ALL_CORE
Will identify 2 coping skills that assist with focus on reality

## 2023-04-07 NOTE — BH INPATIENT PSYCHIATRY PROGRESS NOTE - NSTXDCOPLKGOAL_PSY_ALL_CORE
Will agree to consider an appropriate level of outpatient care

## 2023-04-07 NOTE — BH INPATIENT PSYCHIATRY PROGRESS NOTE - NSBHMSEAFFRANGE_PSY_A_CORE
Constricted
Full
Constricted
Full
Constricted
Full
Constricted

## 2023-04-07 NOTE — BH INPATIENT PSYCHIATRY PROGRESS NOTE - NSBHASSESSSUMMFT_PSY_ALL_CORE
Patient is a 22-year-old female with no past psychiatric history brought in by EMS activated by parents for psychosis, which began in December and worsened over the past weeks. The patient has been paranoid, exhibiting a tangential thought process, and unable to attend to her ADLs. The patient was reportedly lying in her room with feces and urine. She has been unable to eat. The patient also has a family history of schizophrenia, and she has been using cannabis leading up to her current hospitalization. The differential includes schizophreniform disorder and cannabis induced psychotic disorder, but given Her family history, a psychotic spectrum illness is more likely. Given her recent inability to care for ADLs, she is a danger to herself and therefore meets criteria for inpatient psychiatric hospitalization.  PsyHx: No admissions, no SA  : [employer] studying psychology. Lives at home with parents and 10 yo sister.     Week of 3/16: Patient's catatonic symptoms have resolved, and her psychotic symptoms are resolving with risperidone. She is preoccupied with PTSD flashbacks and has poor insight into the reason for hospitalization.   Week of 3/22: Catatonic symptoms and disorganization have returned as Ativan was tapered; we will increase the dose slightly.  Psychosis continues to improve. Poor insight into illness; she feels her symptoms are mostly caused by PTSD symptoms.  Week of 3/27: Catatonia and psychosis has resolved. She continues to report anxiety and panic attacks. At home, she medicates herself with cannabis; we discussed the danger of doing this in light of her psychosis. Due to substance use disorder, we will taper dose of lorazepam prior to discharge.     1. Admission status  9.27 (Involuntary admission)    2. Significant lab findings  - UDS positive for THC    3. Psychotropic medication  - Risperidone 3 mg QHS (psychosis)  	- Start 3/13, inc 3/14, inc 3/22  - Lorazepam 0.5 mg daily, 1 mg QHS (catatonia)  	- Start 3/13, inc 3/15, dec 3/17, dec 3/20, dec 3/21, inc 3/22, dec 3/27    Agitation PRNs: Haloperidol PO/IM, Lorazepam PO/IM, Diphenhydramine PO/IM    4. Medical conditions, other medication, consults  None    5. Psychosocial interventions  - Patient provided verbal consent to speak with father  - CO 1:1: Not required  - Restrictions/allowances: None  
Patient is a 22-year-old female with no past psychiatric history brought in by EMS activated by parents for psychosis, which began in December and worsened over the past weeks. The patient has been paranoid, exhibiting a tangential thought process, and unable to attend to her ADLs. The patient was reportedly lying in her room with feces and urine. She has been unable to eat. The patient also has a family history of schizophrenia, and she has been using cannabis leading up to her current hospitalization. The differential includes schizophreniform disorder and cannabis induced psychotic disorder, but given Her family history, a psychotic spectrum illness is more likely. Given her recent inability to care for ADLs, she is a danger to herself and therefore meets criteria for inpatient psychiatric hospitalization.  PsyHx: No admissions, no SA  : [employer] studying psychology. Lives at home with parents and 12 yo sister.     Week of 3/16: Patient's catatonic symptoms have resolved, and her psychotic symptoms are resolving with risperidone. She is preoccupied with PTSD flashbacks and has poor insight into the reason for hospitalization.   Week of 3/22: Catatonic symptoms and disorganization have returned as Ativan was tapered; we will increase the dose slightly.      1. Admission status  9.27 (Involuntary admission)    2. Significant lab findings  - UDS positive for THC    3. Psychotropic medication  - Risperidone 3 mg QHS (psychosis)  	- Start 3/13, inc 3/14, inc 3/22  - Lorazepam 1 mg BID (catatonia)  	- Start 3/13, inc 3/15, dec 3/17, dec 3/20, dec 3/21, inc 3/22    Agitation PRNs: Haloperidol PO/IM, Lorazepam PO/IM, Diphenhydramine PO/IM    4. Medical conditions, other medication, consults  None    5. Psychosocial interventions  - Patient provided verbal consent to speak with father  - CO 1:1: Not required  - Restrictions/allowances: None  
Patient is a 22-year-old female with no past psychiatric history brought in by EMS activated by parents for psychosis, which began in December and worsened over the past weeks. The patient has been paranoid, exhibiting a tangential thought process, and unable to attend to her ADLs. The patient was reportedly lying in her room with feces and urine. She has been unable to eat. The patient also has a family history of schizophrenia, and she has been using cannabis leading up to her current hospitalization. The differential includes schizophreniform disorder and cannabis induced psychotic disorder, but given Her family history, a psychotic spectrum illness is more likely. Given her recent inability to care for ADLs, she is a danger to herself and therefore meets criteria for inpatient psychiatric hospitalization.  PsyHx: No admissions, no SA  : [employer] studying psychology. Lives at home with parents and 10 yo sister.     Week of 3/16: Patient's catatonic symptoms have resolved, and her psychotic symptoms are resolving with risperidone. She is preoccupied with PTSD flashbacks and has poor insight into the reason for hospitalization.   Week of 3/22: Catatonic symptoms and disorganization have returned as Ativan was tapered; we will increase the dose slightly.  Psychosis continues to improve. Poor insight into illness; she feels her symptoms are mostly caused by PTSD symptoms.  Week of 3/27: Catatonia and psychosis has resolved. She continues to report anxiety and panic attacks. At home, she medicates herself with cannabis; we discussed the danger of doing this in light of her psychosis. Due to substance use disorder, we will taper dose of lorazepam prior to discharge.   Week of 4/3: Patient has been more irritable and guarded; this improved somewhat with an increased dose of Risperidal.     1. Admission status  9.27 (Involuntary admission)    2. Significant lab findings  - UDS positive for THC    3. Psychotropic medication  - Risperidone 4 mg QHS (psychosis)  	- Start 3/13, inc 3/14, inc 3/22, inc 4/5  - Lorazepam 0.5 mg daily, 1 mg QHS (catatonia, anxiety)  	- Start 3/13, inc 3/15, dec 3/17, dec 3/20, dec 3/21, inc 3/22, dec 3/27, dec 3/30, inc 4/5    Agitation PRNs: Haloperidol PO/IM, Lorazepam PO/IM, Diphenhydramine PO/IM    4. Medical conditions, other medication, consults  None    5. Psychosocial interventions  - Patient provided verbal consent to speak with father  - CO 1:1: Not required  - Restrictions/allowances: None  
Patient is a 22-year-old female with no past psychiatric history brought in by EMS activated by parents for psychosis, which began in December and worsened over the past weeks. The patient has been paranoid, exhibiting a tangential thought process, and unable to attend to her ADLs. The patient was reportedly lying in her room with feces and urine. She has been unable to eat. The patient also has a family history of schizophrenia, and she has been using cannabis leading up to her current hospitalization. The differential includes schizophreniform disorder and cannabis induced psychotic disorder, but given Her family history, a psychotic spectrum illness is more likely. Given her recent inability to care for ADLs, she is a danger to herself and therefore meets criteria for inpatient psychiatric hospitalization.  PsyHx: No admissions, no SA  : [employer] studying psychology. Lives at home with parents and 12 yo sister.     Week of 3/16: Patient's catatonic symptoms have resolved, and her psychotic symptoms are resolving with risperidone. She is preoccupied with PTSD flashbacks and has poor insight into the reason for hospitalization.   Week of 3/22: Catatonic symptoms and disorganization have returned as Ativan was tapered; we will increase the dose slightly.  Psychosis continues to improve. Poor insight into illness; she feels her symptoms are mostly caused by PTSD symptoms.  Week of 3/27: Catatonia and psychosis has resolved. She continues to report anxiety and panic attacks. At home, she medicates herself with cannabis; we discussed the danger of doing this in light of her psychosis. Due to substance use disorder, we will taper dose of lorazepam prior to discharge.     1. Admission status  9.27 (Involuntary admission)    2. Significant lab findings  - UDS positive for THC    3. Psychotropic medication  - Risperidone 3 mg QHS (psychosis)  	- Start 3/13, inc 3/14, inc 3/22  - Lorazepam 1 mg QHS (catatonia)  	- Start 3/13, inc 3/15, dec 3/17, dec 3/20, dec 3/21, inc 3/22, dec 3/27, dec 3/30    Agitation PRNs: Haloperidol PO/IM, Lorazepam PO/IM, Diphenhydramine PO/IM    4. Medical conditions, other medication, consults  None    5. Psychosocial interventions  - Patient provided verbal consent to speak with father  - CO 1:1: Not required  - Restrictions/allowances: None  
Patient is a 22-year-old female with no past psychiatric history brought in by EMS activated by parents for psychosis, which began in December and worsened over the past weeks. The patient has been paranoid, exhibiting a tangential thought process, and unable to attend to her ADLs. The patient was reportedly lying in her room with feces and urine. She has been unable to eat. The patient also has a family history of schizophrenia, and she has been using cannabis leading up to her current hospitalization. The differential includes schizophreniform disorder and cannabis induced psychotic disorder, but given Her family history, a psychotic spectrum illness is more likely. Given her recent inability to care for ADLs, she is a danger to herself and therefore meets criteria for inpatient psychiatric hospitalization.  PsyHx: No admissions, no SA  : [employer] studying psychology. Lives at home with parents and 12 yo sister.     Week of 3/16: Patient's catatonic symptoms have resolved, and her psychotic symptoms are resolving with risperidone. She is preoccupied with PTSD flashbacks and has poor insight into the reason for hospitalization.   Week of 3/22: Catatonic symptoms and disorganization have returned as Ativan was tapered; we will increase the dose slightly.      1. Admission status  9.27 (Involuntary admission)    2. Significant lab findings  - UDS positive for THC    3. Psychotropic medication  - Risperidone 3 mg QHS (psychosis)  	- Start 3/13, inc 3/14, inc 3/22  - Lorazepam 1 mg BID (catatonia)  	- Start 3/13, inc 3/15, dec 3/17, dec 3/20, dec 3/21, inc 3/22    Agitation PRNs: Haloperidol PO/IM, Lorazepam PO/IM, Diphenhydramine PO/IM    4. Medical conditions, other medication, consults  None    5. Psychosocial interventions  - Patient provided verbal consent to speak with father  - CO 1:1: Not required  - Restrictions/allowances: None  
Patient is a 22-year-old female with no past psychiatric history brought in by EMS activated by parents for psychosis, which began in December and worsened over the past weeks. The patient has been paranoid, exhibiting a tangential thought process, and unable to attend to her ADLs. The patient was reportedly lying in her room with feces and urine. She has been unable to eat. The patient also has a family history of schizophrenia, and she has been using cannabis leading up to her current hospitalization. The differential includes schizophreniform disorder and cannabis induced psychotic disorder, but given Her family history, a psychotic spectrum illness is more likely. Given her recent inability to care for ADLs, she is a danger to herself and therefore meets criteria for inpatient psychiatric hospitalization.  PsyHx: No admissions, no SA  : [employer] studying psychology. Lives at home with parents and 10 yo sister.     Week of 3/16: Patient's catatonic symptoms have resolved, and her psychotic symptoms are resolving with risperidone. She is preoccupied with PTSD flashbacks and has poor insight into the reason for hospitalization.   Week of 3/22: Catatonic symptoms and disorganization have returned as Ativan was tapered; we will increase the dose slightly.  Psychosis continues to improve. Poor insight into illness; she feels her symptoms are mostly caused by PTSD symptoms.  Week of 3/27: Catatonia and psychosis has resolved. She continues to report anxiety and panic attacks. At home, she medicates herself with cannabis; we discussed the danger of doing this in light of her psychosis. Due to substance use disorder, we will taper dose of lorazepam prior to discharge.     1. Admission status  9.27 (Involuntary admission)    2. Significant lab findings  - UDS positive for THC    3. Psychotropic medication  - Risperidone 3 mg QHS (psychosis)  	- Start 3/13, inc 3/14, inc 3/22  - Lorazepam 1 mg QHS (catatonia, anxiety)  	- Start 3/13, inc 3/15, dec 3/17, dec 3/20, dec 3/21, inc 3/22, dec 3/27, dec 3/30    Agitation PRNs: Haloperidol PO/IM, Lorazepam PO/IM, Diphenhydramine PO/IM    4. Medical conditions, other medication, consults  None    5. Psychosocial interventions  - Patient provided verbal consent to speak with father  - CO 1:1: Not required  - Restrictions/allowances: None  
Patient is a 22-year-old female with no past psychiatric history brought in by EMS activated by parents for psychosis, which began in December and worsened over the past weeks. The patient has been paranoid, exhibiting a tangential thought process, and unable to attend to her ADLs. The patient was reportedly lying in her room with feces and urine. She has been unable to eat. The patient also has a family history of schizophrenia, and she has been using cannabis leading up to her current hospitalization. The differential includes schizophreniform disorder and cannabis induced psychotic disorder, but given Her family history, a psychotic spectrum illness is more likely. Given her recent inability to care for ADLs, she is a danger to herself and therefore meets criteria for inpatient psychiatric hospitalization.  PsyHx: No admissions, no SA  : [employer] studying psychology. Lives at home with parents and 12 yo sister.     Week of 3/16: Patient's catatonic symptoms have resolved, and her psychotic symptoms are resolving with risperidone. She is preoccupied with PTSD flashbacks and has poor insight into the reason for hospitalization.   Week of 3/22: Catatonic symptoms and disorganization have returned as Ativan was tapered; we will increase the dose slightly.  Psychosis continues to improve. Poor insight into illness; she feels her symptoms are mostly caused by PTSD symptoms.  Week of 3/27: Catatonia and psychosis has resolved. She continues to report anxiety and panic attacks. At home, she medicates herself with cannabis; we discussed the danger of doing this in light of her psychosis. Due to substance use disorder, we will taper dose of lorazepam prior to discharge.   Week of 4/3: Patient has been more irritable and guarded; this improved somewhat with an increased dose of Risperidal. She continues to exhibit some negative symptoms of psychosis such as avolition, anhedonia, and alogia, but positive symptoms are resolved.     1. Admission status  9.27 (Involuntary admission)    2. Significant lab findings  - UDS positive for THC    3. Psychotropic medication  - Risperidone 4 mg QHS (psychosis)  	- Start 3/13, inc 3/14, inc 3/22, inc 4/5  - Lorazepam 0.5 mg daily, 1 mg QHS (catatonia, anxiety)  	- Start 3/13, inc 3/15, dec 3/17, dec 3/20, dec 3/21, inc 3/22, dec 3/27, dec 3/30, inc 4/5    Agitation PRNs: Haloperidol PO/IM, Lorazepam PO/IM, Diphenhydramine PO/IM    4. Medical conditions, other medication, consults  None    5. Psychosocial interventions  - Patient provided verbal consent to speak with father  - CO 1:1: Not required  - Restrictions/allowances: None  
Patient is a 22-year-old female with no past psychiatric history brought in by EMS activated by parents for psychosis, which began in December and worsened over the past weeks. The patient has been paranoid, exhibiting a tangential thought process, and unable to attend to her ADLs. The patient was reportedly lying in her room with feces and urine. She has been unable to eat. The patient also has a family history of schizophrenia, and she has been using cannabis leading up to her current hospitalization. The differential includes schizophreniform disorder and cannabis induced psychotic disorder, but given Her family history, a psychotic spectrum illness is more likely. Given her recent inability to care for ADLs, she is a danger to herself and therefore meets criteria for inpatient psychiatric hospitalization.  PsyHx: No admissions, no SA  : [employer] studying psychology. Lives at home with parents and 10 yo sister.     Week of 3/16: Patient's catatonic symptoms have resolved, and her psychotic symptoms are resolving with risperidone. She is preoccupied with PTSD flashbacks and has poor insight into the reason for hospitalization.   Week of 3/22: Catatonic symptoms and disorganization have returned as Ativan was tapered; we will increase the dose slightly.  Psychosis continues to improve. Poor insight into illness; she feels her symptoms are mostly caused by PTSD symptoms.  Week of 3/27: Catatonia and psychosis has resolved. She continues to report anxiety and panic attacks. At home, she medicates herself with cannabis; we discussed the danger of doing this in light of her psychosis. Due to substance use disorder, we will taper dose of lorazepam prior to discharge.   Week of 4/3: Patient has been more irritable and guarded; unclear if this is caused by psychosis.     1. Admission status  9.27 (Involuntary admission)    2. Significant lab findings  - UDS positive for THC    3. Psychotropic medication  - Risperidone 3 mg QHS (psychosis)  	- Start 3/13, inc 3/14, inc 3/22  - Lorazepam 1 mg QHS (catatonia, anxiety)  	- Start 3/13, inc 3/15, dec 3/17, dec 3/20, dec 3/21, inc 3/22, dec 3/27, dec 3/30    Agitation PRNs: Haloperidol PO/IM, Lorazepam PO/IM, Diphenhydramine PO/IM    4. Medical conditions, other medication, consults  None    5. Psychosocial interventions  - Patient provided verbal consent to speak with father  - CO 1:1: Not required  - Restrictions/allowances: None  
Patient is a 22-year-old female with no past psychiatric history brought in by EMS activated by parents for psychosis, which began in December and worsened over the past weeks. The patient has been paranoid, exhibiting a tangential thought process, and unable to attend to her ADLs. The patient was reportedly lying in her room with feces and urine. She has been unable to eat. The patient also has a family history of schizophrenia, and she has been using cannabis leading up to her current hospitalization. The differential includes schizophreniform disorder and cannabis induced psychotic disorder, but given Her family history, a psychotic spectrum illness is more likely. Given her recent inability to care for ADLs, she is a danger to herself and therefore meets criteria for inpatient psychiatric hospitalization.  PsyHx: No admissions, no SA  : [employer] studying psychology. Lives at home with parents and 10 yo sister.     Week of 3/16: Patient's catatonic symptoms have resolved, and her psychotic symptoms are resolving with risperidone. She is preoccupied with PTSD flashbacks and has poor insight into the reason for hospitalization.   Week of 3/22: Catatonic symptoms and disorganization have returned as Ativan was tapered; we will increase the dose slightly.  Psychosis continues to improve. Poor insight into illness; she feels her symptoms are mostly caused by PTSD symptoms.  Week of 3/27: Catatonia and psychosis has resolved. She continues to report anxiety and panic attacks. At home, she medicates herself with cannabis; we discussed the danger of doing this in light of her psychosis. Due to substance use disorder, we will taper dose of lorazepam prior to discharge.     04/03 Update  Irritable and guarded on exam today   Denies symptoms but with vague paranoia and poor insight into her symptoms and illness  1. Admission status  9.27 (Involuntary admission)    2. Significant lab findings  - UDS positive for THC    3. Psychotropic medication  - Risperidone 3 mg QHS (psychosis)  	- Start 3/13, inc 3/14, inc 3/22  - Lorazepam 1 mg QHS (catatonia, anxiety)  	- Start 3/13, inc 3/15, dec 3/17, dec 3/20, dec 3/21, inc 3/22, dec 3/27, dec 3/30    Agitation PRNs: Haloperidol PO/IM, Lorazepam PO/IM, Diphenhydramine PO/IM    4. Medical conditions, other medication, consults  None    5. Psychosocial interventions  - Patient provided verbal consent to speak with father  - CO 1:1: Not required  - Restrictions/allowances: None  
Patient is a 22-year-old female with no past psychiatric history brought in by EMS activated by parents for psychosis, which began in December and worsened over the past weeks. The patient has been paranoid, exhibiting a tangential thought process, and unable to attend to her ADLs. The patient was reportedly lying in her room with feces and urine. She has been unable to eat. The patient also has a family history of schizophrenia, and she has been using cannabis leading up to her current hospitalization. The differential includes schizophreniform disorder and cannabis induced psychotic disorder, but given Her family history, a psychotic spectrum illness is more likely. Given her recent inability to care for ADLs, she is a danger to herself and therefore meets criteria for inpatient psychiatric hospitalization.  PsyHx: No admissions, no SA  : [employer] studying psychology. Lives at home with parents and 12 yo sister.     Week of 3/16: Patient's catatonic symptoms have resolved, and her psychotic symptoms are resolving with risperidone. She is preoccupied with PTSD flashbacks and has poor insight into the reason for hospitalization.   Week of 3/22: Catatonic symptoms and disorganization have returned as Ativan was tapered; we will increase the dose slightly.  Psychosis continues to improve. Poor insight into illness; she feels her symptoms are mostly caused by PTSD symptoms.  Week of 3/27: Catatonia and psychosis has resolved. She continues to report anxiety and panic attacks. At home, she medicates herself with cannabis; we discussed the danger of doing this in light of her psychosis. Due to substance use disorder, we will taper dose of lorazepam prior to discharge.     1. Admission status  9.27 (Involuntary admission)    2. Significant lab findings  - UDS positive for THC    3. Psychotropic medication  - Risperidone 3 mg QHS (psychosis)  	- Start 3/13, inc 3/14, inc 3/22  - Lorazepam 0.5 mg daily, 1 mg QHS (catatonia)  	- Start 3/13, inc 3/15, dec 3/17, dec 3/20, dec 3/21, inc 3/22, dec 3/27    Agitation PRNs: Haloperidol PO/IM, Lorazepam PO/IM, Diphenhydramine PO/IM    4. Medical conditions, other medication, consults  None    5. Psychosocial interventions  - Patient provided verbal consent to speak with father  - CO 1:1: Not required  - Restrictions/allowances: None  
Patient is a 22-year-old female with no past psychiatric history brought in by EMS activated by parents for psychosis, which began in December and worsened over the past weeks. The patient has been paranoid, exhibiting a tangential thought process, and unable to attend to her ADLs. The patient was reportedly lying in her room with feces and urine. She has been unable to eat. The patient also has a family history of schizophrenia, and she has been using cannabis leading up to her current hospitalization. The differential includes schizophreniform disorder and cannabis induced psychotic disorder, but given Her family history, a psychotic spectrum illness is more likely. Given her recent inability to care for ADLs, she is a danger to herself and therefore meets criteria for inpatient psychiatric hospitalization.  PsyHx: No admissions, no SA  : [employer] [place] studying psychology. Lives at home with parents and 12 yo sister.     Week of 3/16: Patient's catatonic symptoms have resolved, and her psychotic symptoms are resolving with risperidone. She is preoccupied with PTSD flashbacks and has poor insight into the reason for hospitalization.   Week of 3/22: Catatonic symptoms and disorganization have returned as Ativan was tapered; we will increase the dose slightly.  Psychosis continues to improve. Poor insight into illness; she feels her symptoms are mostly caused by PTSD symptoms.  Week of 3/27: Catatonia and psychosis has resolved. She continues to report anxiety and panic attacks. At home, she medicates herself with cannabis; we discussed the danger of doing this in light of her psychosis. Due to substance use disorder, we will taper dose of lorazepam prior to discharge.     1. Admission status  9.27 (Involuntary admission)    2. Significant lab findings  - UDS positive for THC    3. Psychotropic medication  - Risperidone 3 mg QHS (psychosis)  	- Start 3/13, inc 3/14, inc 3/22  - Lorazepam 0.5 mg daily, 1 mg QHS (catatonia)  	- Start 3/13, inc 3/15, dec 3/17, dec 3/20, dec 3/21, inc 3/22, dec 3/27    Agitation PRNs: Haloperidol PO/IM, Lorazepam PO/IM, Diphenhydramine PO/IM    4. Medical conditions, other medication, consults  None    5. Psychosocial interventions  - Patient provided verbal consent to speak with father  - CO 1:1: Not required  - Restrictions/allowances: None  
Patient is a 22-year-old female with no past psychiatric history brought in by EMS activated by parents for psychosis, which began in December and worsened over the past weeks. The patient has been paranoid, exhibiting a tangential thought process, and unable to attend to her ADLs. The patient was reportedly lying in her room with feces and urine. She has been unable to eat. The patient also has a family history of schizophrenia, and she has been using cannabis leading up to her current hospitalization. The differential includes schizophreniform disorder and cannabis induced psychotic disorder, but given Her family history, a psychotic spectrum illness is more likely. Given her recent inability to care for ADLs, she is a danger to herself and therefore meets criteria for inpatient psychiatric hospitalization.  PsyHx: No admissions, no SA  : [facility] [employer] studying psychology. Lives at home with parents and 10 yo sister.     Week of 3/16: Patient's catatonic symptoms have resolved, and her psychotic symptoms are resolving with risperidone. She is preoccupied with PTSD flashbacks and has poor insight into the reason for hospitalization.     1. Admission status  9.27 (Involuntary admission)    2. Significant lab findings  - UDS positive for THC    3. Psychotropic medication  - Risperidone 2 mg QHS (psychosis)  	- Start 3/13, inc 3/14  - Lorazepam 1 mg QHS (catatonia)  	- Start 3/13, inc 3/15, dec 3/17, dec 3/20, dec 3/21    Agitation PRNs: Haloperidol PO/IM, Lorazepam PO/IM, Diphenhydramine PO/IM    4. Medical conditions, other medication, consults  None    5. Psychosocial interventions  - Patient provided verbal consent to speak with father  - CO 1:1: Not required  - Restrictions/allowances: None  
Patient is a 22-year-old female with no past psychiatric history brought in by EMS activated by parents for psychosis, which began in December and worsened over the past weeks. The patient has been paranoid, exhibiting a tangential thought process, and unable to attend to her ADLs. The patient was reportedly lying in her room with feces and urine. She has been unable to eat. The patient also has a family history of schizophrenia, and she has been using cannabis leading up to her current hospitalization. The differential includes schizophreniform disorder and cannabis induced psychotic disorder, but given Her family history, a psychotic spectrum illness is more likely. Given her recent inability to care for ADLs, she is a danger to herself and therefore meets criteria for inpatient psychiatric hospitalization.  PsyHx: No admissions, no SA  : [employer] [facility] [employer] [facility] studying psychology. Lives at home with parents and 10 yo sister.     Week of 3/16: Patient's catatonic symptoms have resolved, and her psychotic symptoms are resolving with risperidone. She is preoccupied with PTSD flashbacks and has poor insight into the reason for hospitalization.   Week of 3/22: Catatonic symptoms and disorganization have returned as Ativan was tapered; we will increase the dose slightly.  Psychosis continues to improve. Poor insight into illness; she feels her symptoms are mostly caused by PTSD symptoms.    1. Admission status  9.27 (Involuntary admission)    2. Significant lab findings  - UDS positive for THC    3. Psychotropic medication  - Risperidone 3 mg QHS (psychosis)  	- Start 3/13, inc 3/14, inc 3/22  - Lorazepam 1 mg BID (catatonia)  	- Start 3/13, inc 3/15, dec 3/17, dec 3/20, dec 3/21, inc 3/22    Agitation PRNs: Haloperidol PO/IM, Lorazepam PO/IM, Diphenhydramine PO/IM    4. Medical conditions, other medication, consults  None    5. Psychosocial interventions  - Patient provided verbal consent to speak with father  - CO 1:1: Not required  - Restrictions/allowances: None  
Patient is a 22-year-old female with no past psychiatric history brought in by EMS activated by parents for psychosis, which began in December and worsened over the past weeks. The patient has been paranoid, exhibiting a tangential thought process, and unable to attend to her ADLs. The patient was reportedly lying in her room with feces and urine. She has been unable to eat. The patient also has a family history of schizophrenia, and she has been using cannabis leading up to her current hospitalization. The differential includes schizophreniform disorder and cannabis induced psychotic disorder, but given Her family history, a psychotic spectrum illness is more likely. Given her recent inability to care for ADLs, she is a danger to herself and therefore meets criteria for inpatient psychiatric hospitalization.  PsyHx: No admissions, no SA  : [employer] [employer] [facility] studying psychology. Lives at home with parents and 12 yo sister.     Week of 3/16: Patient's catatonic symptoms have resolved, and her psychotic symptoms are resolving with risperidone. She is preoccupied with PTSD flashbacks and has poor insight into the reason for hospitalization.     1. Admission status  9.27 (Involuntary admission)    2. Significant lab findings  - UDS positive for THC    3. Psychotropic medication  - Risperidone 2 mg QHS (psychosis)  	- Start 3/13, inc 3/14  - Lorazepam 2 mg TID (catatonia)  	- Start 3/13, inc 3/15    Agitation PRNs: Haloperidol PO/IM, Lorazepam PO/IM, Diphenhydramine PO/IM    4. Medical conditions, other medication, consults  None    5. Psychosocial interventions  - Patient provided verbal consent to speak with father  - CO 1:1: Not required  - Restrictions/allowances: None  
Patient is a 22-year-old female with no past psychiatric history brought in by EMS activated by parents for psychosis, which began in December and worsened over the past weeks. The patient has been paranoid, exhibiting a tangential thought process, and unable to attend to her ADLs. The patient was reportedly lying in her room with feces and urine. She has been unable to eat. The patient also has a family history of schizophrenia, and she has been using cannabis leading up to her current hospitalization. The differential includes schizophreniform disorder and cannabis induced psychotic disorder, but given Her family history, a psychotic spectrum illness is more likely. Given her recent inability to care for ADLs, she is a danger to herself and therefore meets criteria for inpatient psychiatric hospitalization.  PsyHx: No admissions, no SA  : [employer] [facility] studying psychology. Lives at home with parents and 12 yo sister.     Week of 3/16: Patient's catatonic symptoms have resolved, and her psychotic symptoms are resolving with risperidone. She is preoccupied with PTSD flashbacks and has poor insight into the reason for hospitalization.     1. Admission status  9.27 (Involuntary admission)    2. Significant lab findings  - UDS positive for THC    3. Psychotropic medication  - Risperidone 2 mg QHS (psychosis)  	- Start 3/13, inc 3/14  - Lorazepam 1 mg daily, 1 mg at noon, 2 mg QHS (catatonia)  	- Start 3/13, inc 3/15, dec 3/17    Agitation PRNs: Haloperidol PO/IM, Lorazepam PO/IM, Diphenhydramine PO/IM    4. Medical conditions, other medication, consults  None    5. Psychosocial interventions  - Patient provided verbal consent to speak with father  - CO 1:1: Not required  - Restrictions/allowances: None  
Patient is a 22-year-old female with no past psychiatric history brought in by EMS activated by parents for psychosis, which began in December and worsened over the past weeks. The patient has been paranoid, exhibiting a tangential thought process, and unable to attend to her ADLs. The patient was reportedly lying in her room with feces and urine. She has been unable to eat. The patient also has a family history of schizophrenia, and she has been using cannabis leading up to her current hospitalization. The differential includes schizophreniform disorder and cannabis induced psychotic disorder, but given Her family history, a psychotic spectrum illness is more likely. Given her recent inability to care for ADLs, she is a danger to herself and therefore meets criteria for inpatient psychiatric hospitalization.  PsyHx: No admissions, no SA  : [employer] studying psychology. Lives at home with parents and 10 yo sister.     Week of 3/16: Patient's catatonic symptoms have resolved, and her psychotic symptoms are resolving with risperidone. She is preoccupied with PTSD flashbacks and has poor insight into the reason for hospitalization.   Week of 3/22: Catatonic symptoms and disorganization have returned as Ativan was tapered; we will increase the dose slightly.  Psychosis continues to improve. Poor insight into illness; she feels her symptoms are mostly caused by PTSD symptoms.  Week of 3/27: Catatonia and psychosis has resolved. She continues to report anxiety and panic attacks. At home, she medicates herself with cannabis; we discussed the danger of doing this in light of her psychosis. Due to substance use disorder, we will taper dose of lorazepam prior to discharge.   Week of 4/3: Patient has been more irritable and guarded; unclear if this is caused by psychosis.     1. Admission status  9.27 (Involuntary admission)    2. Significant lab findings  - UDS positive for THC    3. Psychotropic medication  - Risperidone 4 mg QHS (psychosis)  	- Start 3/13, inc 3/14, inc 3/22, inc 4/5  - Lorazepam 0.5 mg daily, 1 mg QHS (catatonia, anxiety)  	- Start 3/13, inc 3/15, dec 3/17, dec 3/20, dec 3/21, inc 3/22, dec 3/27, dec 3/30, inc 4/5    Agitation PRNs: Haloperidol PO/IM, Lorazepam PO/IM, Diphenhydramine PO/IM    4. Medical conditions, other medication, consults  None    5. Psychosocial interventions  - Patient provided verbal consent to speak with father  - CO 1:1: Not required  - Restrictions/allowances: None  
Patient is a 22-year-old female with no past psychiatric history brought in by EMS activated by parents for psychosis, which began in December and worsened over the past weeks. The patient has been paranoid, exhibiting a tangential thought process, and unable to attend to her ADLs. The patient was reportedly lying in her room with feces and urine. She has been unable to eat. The patient also has a family history of schizophrenia, and she has been using cannabis leading up to her current hospitalization. The differential includes schizophreniform disorder and cannabis induced psychotic disorder, but given Her family history, a psychotic spectrum illness is more likely. Given her recent inability to care for ADLs, she is a danger to herself and therefore meets criteria for inpatient psychiatric hospitalization.  PsyHx: No admissions, no SA  : [employer] studying psychology. Lives at home with parents and 10 yo sister.     1. Admission status  9.27 (Involuntary admission)    2. Significant lab findings  - UDS positive for THC    3. Psychotropic medication  - Risperidone 2 mg QHS (psychosis)  	- Start 3/13, inc 3/14  - Lorazepam 1 mg TID (catatonia)  	- Start 3/13    Agitation PRNs: Haloperidol PO/IM, Lorazepam PO/IM, Diphenhydramine PO/IM    4. Medical conditions, other medication, consults  None    5. Psychosocial interventions  - Patient provided verbal consent to speak with father  - CO 1:1: Not required  - Restrictions/allowances: None  
Patient is a 22-year-old female with no past psychiatric history brought in by EMS activated by parents for psychosis, which began in December and worsened over the past weeks. The patient has been paranoid, exhibiting a tangential thought process, and unable to attend to her ADLs. The patient was reportedly lying in her room with feces and urine. She has been unable to eat. The patient also has a family history of schizophrenia, and she has been using cannabis leading up to her current hospitalization. The differential includes schizophreniform disorder and cannabis induced psychotic disorder, but given Her family history, a psychotic spectrum illness is more likely. Given her recent inability to care for ADLs, she is a danger to herself and therefore meets criteria for inpatient psychiatric hospitalization.  PsyHx: No admissions, no SA  : [employer] studying psychology. Lives at home with parents and 10 yo sister.     1. Admission status  9.27 (Involuntary admission)    2. Significant lab findings  - UDS positive for THC    3. Psychotropic medication  - Risperidone 2 mg QHS (psychosis)  	- Start 3/13, inc 3/14  - Lorazepam 2 mg TID (catatonia)  	- Start 3/13, inc 3/15    Agitation PRNs: Haloperidol PO/IM, Lorazepam PO/IM, Diphenhydramine PO/IM    4. Medical conditions, other medication, consults  None    5. Psychosocial interventions  - Patient provided verbal consent to speak with father  - CO 1:1: Not required  - Restrictions/allowances: None  
Patient is a 22-year-old female with no past psychiatric history brought in by EMS activated by parents for psychosis, which began in December and worsened over the past weeks. The patient has been paranoid, exhibiting a tangential thought process, and unable to attend to her ADLs. The patient was reportedly lying in her room with feces and urine. She has been unable to eat. The patient also has a family history of schizophrenia, and she has been using cannabis leading up to her current hospitalization. The differential includes schizophreniform disorder and cannabis induced psychotic disorder, but given Her family history, a psychotic spectrum illness is more likely. Given her recent inability to care for ADLs, she is a danger to herself and therefore meets criteria for inpatient psychiatric hospitalization.  PsyHx: No admissions, no SA  : [employer] studying psychology. Lives at home with parents and 12 yo sister.     Week of 3/16: Patient's catatonic symptoms have resolved, and her psychotic symptoms are resolving with risperidone. She is preoccupied with PTSD flashbacks and has poor insight into the reason for hospitalization.     1. Admission status  9.27 (Involuntary admission)    2. Significant lab findings  - UDS positive for THC    3. Psychotropic medication  - Risperidone 2 mg QHS (psychosis)  	- Start 3/13, inc 3/14  - Lorazepam 1 mg BID (catatonia)  	- Start 3/13, inc 3/15, dec 3/17, dec 3/20    Agitation PRNs: Haloperidol PO/IM, Lorazepam PO/IM, Diphenhydramine PO/IM    4. Medical conditions, other medication, consults  None    5. Psychosocial interventions  - Patient provided verbal consent to speak with father  - CO 1:1: Not required  - Restrictions/allowances: None

## 2023-04-07 NOTE — BH INPATIENT PSYCHIATRY PROGRESS NOTE - NSBHROSSYSTEMS_PSY_ALL_CORE
Psychiatric

## 2023-04-07 NOTE — BH INPATIENT PSYCHIATRY PROGRESS NOTE - NSBHFUPINTERVALCCFT_PSY_A_CORE
"I'm having trauma flashbacks"
Psychosis
"I'm less sleepy"
"I'm having trauma flashbacks"
"I'm less sleepy"
"Why did you wake me up and why do you need to ask me these questions?"
"It's just like Advil"
Psychosis
"I'm having trauma flashbacks"
"I'm less sleepy"
"I'm having trauma flashbacks"
"I'm less sleepy"
"I'm less sleepy"

## 2023-04-07 NOTE — BH INPATIENT PSYCHIATRY PROGRESS NOTE - MSE OPTIONS
Structured MSE
Unstructured MSE
Structured MSE

## 2023-04-07 NOTE — BH INPATIENT PSYCHIATRY PROGRESS NOTE - NSBHMSEMOVE_PSY_A_CORE
No abnormal movements
No abnormal movements/Other
No abnormal movements/Other
No abnormal movements

## 2023-04-07 NOTE — BH INPATIENT PSYCHIATRY PROGRESS NOTE - NSTXDISORGGOAL_PSY_ALL_CORE
Will identify 2 coping skills that assist in organizing

## 2023-04-12 ENCOUNTER — OUTPATIENT (OUTPATIENT)
Dept: OUTPATIENT SERVICES | Facility: HOSPITAL | Age: 23
LOS: 1 days | Discharge: HOME | End: 2023-04-12
Payer: COMMERCIAL

## 2023-04-12 NOTE — SOCIAL WORK POST DISCHARGE FOLLOW UP NOTE - NSBHSWFOLLOWUP_PSY_ALL_CORE_FT
SW has been in conversation with both pt/father and [facility] . As per discussion pt was no longer agreeable with a referral to [facility] and instead is interested in referral to a clinic. SW received her permission to make a referral to [facility], has scheduled appt for Breanna Sorto on  04/13/23 @ 09:00 am  with [facility] via Zoom. YAZAN has notified both pt and father, both have confirmed appt date/time. YAZAN has also informed the [facility] program and social work department of the updates.

## 2023-04-13 PROCEDURE — 98968 PH1 ASSMT&MGMT NQHP 21-30: CPT

## 2023-04-13 PROCEDURE — 90792 PSYCH DIAG EVAL W/MED SRVCS: CPT | Mod: 95

## 2023-04-26 PROCEDURE — 99214 OFFICE O/P EST MOD 30 MIN: CPT | Mod: 95

## 2023-05-18 DIAGNOSIS — F12.20 CANNABIS DEPENDENCE, UNCOMPLICATED: ICD-10-CM

## 2023-05-18 DIAGNOSIS — F20.81 SCHIZOPHRENIFORM DISORDER: ICD-10-CM

## 2023-05-18 DIAGNOSIS — G43.909 MIGRAINE, UNSPECIFIED, NOT INTRACTABLE, WITHOUT STATUS MIGRAINOSUS: ICD-10-CM

## 2023-05-18 DIAGNOSIS — F06.1 CATATONIC DISORDER DUE TO KNOWN PHYSIOLOGICAL CONDITION: ICD-10-CM

## 2023-05-23 NOTE — BH TREATMENT PLAN - NSTXDISORGDATETRGT_PSY_ALL_CORE
Quality 130: Documentation Of Current Medications In The Medical Record: Current Medications Documented
Detail Level: Detailed
22-Mar-2023
03-Apr-2023
27-Mar-2023
10-Apr-2023

## 2023-07-20 PROCEDURE — 99214 OFFICE O/P EST MOD 30 MIN: CPT | Mod: 95

## 2023-08-03 PROCEDURE — 99214 OFFICE O/P EST MOD 30 MIN: CPT

## 2023-08-24 PROCEDURE — 99214 OFFICE O/P EST MOD 30 MIN: CPT | Mod: 95

## 2023-09-08 PROCEDURE — 99214 OFFICE O/P EST MOD 30 MIN: CPT | Mod: 95

## 2023-10-05 PROCEDURE — 99212 OFFICE O/P EST SF 10 MIN: CPT | Mod: 95

## 2023-11-02 PROCEDURE — 99214 OFFICE O/P EST MOD 30 MIN: CPT | Mod: 95

## 2023-12-05 PROCEDURE — 99214 OFFICE O/P EST MOD 30 MIN: CPT | Mod: 95

## 2024-01-09 PROCEDURE — 99214 OFFICE O/P EST MOD 30 MIN: CPT | Mod: 95

## 2024-01-17 NOTE — CHART NOTE - NSCHARTNOTESELECT_GEN_ALL_CORE
Event Note Back  Pain Orientation: Lower, Right, Left  Pain Descriptors: Dull, Aching, Throbbing    Functional Status    Dominant Hand: : Right    OBJECTIVE EXAMINATION   Restrictions:   None     Review of Systems:  Integumentary Assessment  Edema: No    Palpation:   Lumbar Spine Palpation: pain with palpation to B SI regions and across thoracolumbar spine    Ambulation/Gait (if applicable):   Ambulation  Surface: Level tile  Device: No Device  Assistance: Independent  Quality of Gait: limited heel-toe mechanics bilaterally with significant ankle pronation bilaterally    Balance Screen:   Balance  Posture: Fair (fwd head/rounded shoulder posturing)  Sitting - Static: Good  Sitting - Dynamic: Good  Standing - Static: Good  Standing - Dynamic: Good    Neuro Screen:   Left Dermatomes  Right Dermatomes      Left LE Dermatomes  L1 (Inguinal Ligament): Intact  L2 (Medial Anterior Thigh): Intact  L3 (Medial Knee): Intact  L4 (Medial Malleolus): Intact  L5 (Dorsum 3rd MTP): Intact  S1 (Lateral Malleolus): Intact  S2 (Popliteal Fossa): Intact Right LE Dermatomes  L1 (Inguinal Ligament): Intact  L2 (Medial Anterior Thigh): Intact  L3 (Medial Knee): Intact  L4 (Medial Malleolus): Intact  L5 (Dorsum 3rd MTP): Intact  S1 (Lateral Malleolus): Intact  S2 (Popliteal Fossa): Intact       Sensation      Sensation  Overall Sensation Status: WFL     Left AROM  Right AROM       LLE- WFL     RLE- WFL        Left Strength  Right Strength         Strength LLE  L Hip Flexion: 4+/5  L Hip ABduction: 4+/5  L Hip ADduction: 4+/5  L Knee Flexion: 4+/5  L Knee Extension: 4+/5  L Ankle Dorsiflexion: 4+/5    Strength RLE  R Hip Flexion: 4+/5  R Hip ABduction: 4+/5  R Hip ADduction: 4+/5  R Knee Flexion: 4+/5  R Knee Extension: 4+/5  R Ankle Dorsiflexion: 4+/5     Lumbar Assessment     AROM Lumbar Spine   Lumbar spine general AROM: limited 50% bilaterally       Trunk Strength     Trunk Strength  Trunk Flexion: 4-/5  Trunk Extension: 4-/5     Muscle

## 2024-02-06 PROCEDURE — 99214 OFFICE O/P EST MOD 30 MIN: CPT | Mod: 95

## 2024-04-09 PROCEDURE — 99214 OFFICE O/P EST MOD 30 MIN: CPT | Mod: 95

## 2024-05-21 PROCEDURE — 99214 OFFICE O/P EST MOD 30 MIN: CPT | Mod: 95

## 2024-06-04 PROCEDURE — 99214 OFFICE O/P EST MOD 30 MIN: CPT | Mod: 95

## 2024-06-18 PROCEDURE — 99214 OFFICE O/P EST MOD 30 MIN: CPT | Mod: 95

## 2024-07-09 PROCEDURE — 99214 OFFICE O/P EST MOD 30 MIN: CPT | Mod: 95

## 2024-07-25 ENCOUNTER — APPOINTMENT (OUTPATIENT)
Age: 24
End: 2024-07-25

## 2024-09-27 NOTE — BH INPATIENT PSYCHIATRY ASSESSMENT NOTE - NSBHMSEMOOD_PSY_A_CORE
He is given the number to schedule her stress echo.  She would like to follow-up with a different cardiologist.  I have given her referral to see Dr. Jalloh   Anxious

## 2025-03-28 PROBLEM — Z00.00 ENCOUNTER FOR PREVENTIVE HEALTH EXAMINATION: Status: ACTIVE | Noted: 2025-03-28

## 2025-04-27 ENCOUNTER — NON-APPOINTMENT (OUTPATIENT)
Age: 25
End: 2025-04-27

## 2025-05-28 NOTE — BH DISCHARGE NOTE NURSING/SOCIAL WORK/PSYCH REHAB - NSBHDCPHONE1FT_PSY_A_CORE
Ok to keep as scheduled.    Requested Prescriptions   Pending Prescriptions Disp Refills    losartan (COZAAR) 25 MG tablet 90 tablet 1     Sig: Take 1 tablet (25 mg) by mouth daily.       Angiotensin-II Receptors Failed - 5/28/2025 12:15 PM        Failed - Has GFR on file in past 12 months and most recent value is normal        Failed - Normal serum potassium on file in past 12 months     Recent Labs   Lab Test 04/05/24  0858   POTASSIUM 4.2                    Passed - Most recent blood pressure under 140/90 in past 12 months     BP Readings from Last 3 Encounters:   04/16/25 136/80   02/12/25 130/82   01/08/25 135/80       No data recorded            Passed - Medication is active on med list and the sig matches. RN to manually verify dose and sig if red X/fail.     If the protocol passes (green check), you do not need to verify med dose and sig.    A prescription matches if they are the same clinical intention.    For Example: once daily and every morning are the same.    The protocol can not identify upper and lower case letters as matching and will fail.     For Example: Take 1 tablet (50 mg) by mouth daily     TAKE 1 TABLET (50 MG) BY MOUTH DAILY    For all fails (red x), verify dose and sig.    If the refill does match what is on file, the RN can still proceed to approve the refill request.       If they do not match, route to the appropriate provider.             Passed - Medication indicated for associated diagnosis     The medication is prescribed for one or more of the following conditions:    Chronic Kidney Disease (CDK)    Heart Failure (HF)    Diabetes, Nephropathy   Hypertension    Coronary Artery Disease (CAD)   Raynaud's Disease   Ischemic cardiomyopathy   Cardiomyopathy   Pulmonary Hypertension          Passed - Recent (12 month) or future (90 days) visit with authorizing provider's specialty (provided they have been seen in the past 15 months)     The patient must have completed an in-person or  virtual visit within the past 12 months or has a future visit scheduled within the next 90 days with the authorizing provider s specialty.  Urgent care and e-visits do not qualify as an office visit for this protocol.          Passed - Patient is age 18 or older             [phone]

## 2025-08-06 ENCOUNTER — APPOINTMENT (OUTPATIENT)
Age: 25
End: 2025-08-06

## 2025-09-03 ENCOUNTER — APPOINTMENT (OUTPATIENT)
Age: 25
End: 2025-09-03

## 2025-09-11 ENCOUNTER — APPOINTMENT (OUTPATIENT)
Age: 25
End: 2025-09-11